# Patient Record
Sex: FEMALE | Race: BLACK OR AFRICAN AMERICAN | NOT HISPANIC OR LATINO | Employment: OTHER | RURAL
[De-identification: names, ages, dates, MRNs, and addresses within clinical notes are randomized per-mention and may not be internally consistent; named-entity substitution may affect disease eponyms.]

---

## 2023-07-26 DIAGNOSIS — M47.896 OTHER SPONDYLOSIS, LUMBAR REGION: Primary | ICD-10-CM

## 2023-07-26 DIAGNOSIS — Z98.1 ARTHRODESIS STATUS: ICD-10-CM

## 2023-08-02 ENCOUNTER — CLINICAL SUPPORT (OUTPATIENT)
Dept: REHABILITATION | Facility: HOSPITAL | Age: 68
End: 2023-08-02
Payer: COMMERCIAL

## 2023-08-02 DIAGNOSIS — Z98.1 ARTHRODESIS STATUS: ICD-10-CM

## 2023-08-02 DIAGNOSIS — M47.896 OTHER SPONDYLOSIS, LUMBAR REGION: ICD-10-CM

## 2023-08-02 PROCEDURE — 97161 PT EVAL LOW COMPLEX 20 MIN: CPT | Mod: PN

## 2023-08-02 PROCEDURE — 97110 THERAPEUTIC EXERCISES: CPT | Mod: PN

## 2023-08-02 NOTE — PLAN OF CARE
[facility]   Physical Therapy Initial Evaluation    Name: Elaine Sadler  Clinic Number: [id number]    Therapy Diagnosis:   Encounter Diagnoses   Name Primary?    Other spondylosis, lumbar region     Arthrodesis status      Physician: Hugh Rosenthal MD    Physician Orders: PT Eval and Treat    Medical Diagnosis from Referral: arthrodesis L5 s1 , lumbar spondylosis   Evaluation Date: 8/2/2023  Authorization Period Expiration: wellcare   Plan of Care Expiration: see wellcare   Visit # / Visits authorized: 1/ 11    Time In: 845  Time Out:  0945  Total Appointment Time (timed & untimed codes): 50 minutes    Precautions: Standard    Subjective   Date of onset: pt states she had lumbar sx with fusion L5s1 may 26th,   pt voices she was having weakness down her right leg. Pt voices she was having severe back pain which has improved.  Pt voices she is still sore on the left side of her scar, and she still has weakness in her right lower extremity.  Pt voices she has hx of severe knee pain bilaterally and is a candidate for knee replacements.      History of current condition - Elaine reports: hx above.      Medical History:   No past medical history on file.    Surgical History:   Elaine Sadler  has no past surgical history on file.    Medications:   Elaine currently has no medications in their medication list.    Allergies:   Review of patient's allergies indicates:  Not on File     Imaging, MRI studies:      Prior Therapy: none   Social History:   lives with their family  Occupation: retired cna   Prior Level of Function: independent , with chronic pain   Current Level of Function: weakness in lower extremity and low back pain     Pain:  Current 1/10, worst 7/10, best 1/10   Location: bilateral back , left side more sore but right side more weak  Description: Aching and Dull  Aggravating Factors: Standing she has to lean over.     Easing Factors: relaxation and lying down    Pts goals: be able to stand up  straight and not give out cooking and cleaning.      Objective     Posture:  Standing lordosis: Increased  Sitting lordosis: Increased  Iliac crest height: right increased  PSIS height: right increased  Pelvic rotation/torsion: Yes  Scoliosis: Yes  Lateral shift: right  Comments:      MANUAL MUSCLE TEST  Right left   Hip flexion MMT number: 3+/5 MMT strength: 3/5   Hip abduction MMT strength: 3+/5 MMT strength: 3+/5   Knee extension MMT strength: 3+/5 MMT strength: 3+/5   Knee flexion  MMT strength: 3+/5 MMT strength: 3+/5   Ankle dorsiflexion MMT strength: 3+/5 MMT strength: 3+/5   Ankle plantar flexion  MMT strength: 3+/5 MMT strength: 3+/5   Extensor hallucis longus MMT strength: 4/5 MMT strength: 4/5     ROM/flexibility right left   Hip flexion (120) 105 90   Internal rotation (45) 35 30   External rotation (45) 45 35   Hamstring 90/90 (-10) -20 -10                     Special test Right  Left    SLR test < 60 degrees Negative Negative   SLR test > 60 degrees Negative Negative   Sitting slump test Negative Negative   Piriformis test Negative Negative   ZECHARIAH test Negative Negative   SI forward bend Negative Positive   SI distraction Negative Positive   SI compression Negative Positive     Gait assessment:   Antalgic gait: Yes  Assistive device: quad cane    Spinal mobility:  Segment:     Other test/information:    Palpation: pt has left si joint pain with decreased internal rotation .     Dermatome:      Limitation/Restriction for FOTO lumbar  Survey    Therapist reviewed FOTO scores for Elaine Sadler on 8/2/2023.   FOTO documents entered into [employer] - see Media section.    Limitation Score: 39l%         TREATMENT       Elaine received the treatments listed below:  THERAPEUTIC EXERCISES to develop strength, endurance, ROM, flexibility, and posture for 45  minutes including home ex program     Home Exercises and Patient Education Provided    Education provided:   - range of motion and strength      Written Home  Exercises Provided: Patient instructed to cont prior HEP.  Exercises were reviewed and Elaine was able to demonstrate them prior to the end of the session.  Elaine demonstrated good  understanding of the education provided.     See EMR under Patient Instructions for exercises provided 8/2/2023.    Assessment   Elaine is a 67 y.o. female referred to outpatient Physical Therapy with a medical diagnosis of lumbar radicular pain .lumbar fusion L5s!  Pt presents with decreased range of motion and strength   .  Pt has hx of bilateral severe knee oa     Pt prognosis is Excellent.   Pt will benefit from skilled outpatient Physical Therapy to address the deficits stated above and in the chart below, provide pt/family education, and to maximize pt's level of independence.     Plan of care discussed with patient: Yes  Pt's spiritual, cultural and educational needs considered and patient is agreeable to the plan of care and goals as stated below:     Anticipated Barriers for therapy: medical diagnosis of lumbar radicular pain .lumbar fusion L5s!  Pt presents with decreased range of motion and strength   .  Pt has hx of bilateral severe knee oa        Short Term Goals: 4 weeks   Pt will be independent with home ex program   Pt will be able to increase hip flexion to 110 degress   Be able to increase lower strength to 4/5  Decrease resting pain to 0/10    Long Term Goals: 8  weeks   Pt will have 0/10 with household activity   Be able to stand and cook pain free .       Plan   Plan of care Certification: 8/2/2023 to 10/02/2023 .    Outpatient Physical Therapy 2 times weekly for 8 weeks to include the following interventions: Electrical Stimulation ifc , Manual Therapy, Patient Education, Therapeutic Exercise, and modalities as needed .     Andrew Allen, PT          I CERTIFY THE NEED FOR THESE SERVICES FURNISHED UNDER THIS PLAN OF TREATMENT AND WHILE UNDER MY CARE.    Physician's comments:      Physician's Signature:  ___________________________________________________

## 2023-08-04 ENCOUNTER — CLINICAL SUPPORT (OUTPATIENT)
Dept: REHABILITATION | Facility: HOSPITAL | Age: 68
End: 2023-08-04
Payer: COMMERCIAL

## 2023-08-04 DIAGNOSIS — M47.896 OTHER SPONDYLOSIS, LUMBAR REGION: Primary | ICD-10-CM

## 2023-08-04 PROCEDURE — 97112 NEUROMUSCULAR REEDUCATION: CPT | Mod: PN,CQ

## 2023-08-04 PROCEDURE — 97110 THERAPEUTIC EXERCISES: CPT | Mod: PN,CQ

## 2023-08-04 PROCEDURE — 97014 ELECTRIC STIMULATION THERAPY: CPT | Mod: PN,CQ

## 2023-08-04 NOTE — PROGRESS NOTES
Physical Therapy Treatment Note     Name: Elaine Sadler  Clinic Number: [id number]    Therapy Diagnosis: No diagnosis found.  Physician: Hugh Rosenthal MD    Visit Date: 8/4/2023    Physician Orders: PT Eval and Treat    Medical Diagnosis from Referral: arthrodesis L5 s1 , lumbar spondylosis   Evaluation Date: 8/2/2023  Authorization Period Expiration: wellcare   Plan of Care Expiration: see wellcare   Visit # / Visits authorized: 1/ 11  PTA Visit #: 1/5    Time In: 812  Time Out: 902  Total Billable Time: 50 minutes    Precautions: Standard  Functional Level Prior to Evaluation: independent , with chronic pain     Subjective     Pt reports: she did part of her HEP. States she didn't have any trouble with   She was compliant with home exercise program.  Response to previous treatment: Sore but overall better with stretching   Functional change: decreased pain; improved ROM    Pain: 1/10  Location: bilateral back      Objective     Elaine received therapeutic exercises to develop strength, ROM, flexibility, posture, and core stabilization for 18 minutes including:  Hip ABD/ ADD 10 x 10 sec each with belt and ball  Sidelying ER with red t band x 10 each side with ball  Thera-ball HS curls and lower trunk rotations x 10 reps each  Hamstring Stretches 3 x 30 seconds each bilateral    Elaine received the following manual therapy techniques: (not today)    Elaine participated in neuromuscular re-education activities to improve: Proprioception and Posture for 8 minutes. The following activities were included:  Hooklying: March 2 x 10, Kicks 2 x 10, Post Pelvic tilts 2 x 10      Elaine received the following supervised modalities after being cleared for contradictions: IFC Electrical Stimulation:  Elaine received IFC Electrical Stimulation for pain control applied to the low back. Pt received stimulation at 100 % scan for 15 minutes. Elaine tolderated treatment well without any adverse effects.      Elaine received hot  pack for 15 minutes to low back.        Home Exercises Provided and Patient Education Provided     Education provided: postural cues; cont. HEP    Written Home Exercises Provided: Patient instructed to cont prior HEP.  Exercises were reviewed and Elaine was able to demonstrate them prior to the end of the session.  Elaine demonstrated good  understanding of the education provided.     See EMR under Media for exercises provided prior visit.    Assessment     Pt. With decreased pain to 0/10 and improved ROM AEB SLR to 90 with last HS stretches bilateral today.  Elaine Is progressing well towards her goals.   Pt prognosis is Excellent.     Pt will continue to benefit from skilled outpatient physical therapy to address the deficits listed in the problem list box on initial evaluation, provide pt/family education and to maximize pt's level of independence in the home and community environment.     Pt's spiritual, cultural and educational needs considered and pt agreeable to plan of care and goals.     Anticipated barriers to physical therapy: medical diagnosis of lumbar radicular pain .lumbar fusion L5s!  Pt presents with decreased range of motion and strength   .  Pt has hx of bilateral severe knee oa     Goals:    Short Term Goals: 4 weeks   Pt will be independent with home ex program   Pt will be able to increase hip flexion to 110 degress   Be able to increase lower strength to 4/5  Decrease resting pain to 0/10     Long Term Goals: 8  weeks   Pt will have 0/10 with household activity   Be able to stand and cook pain free     Plan     Plan of care Certification: 8/2/2023 to 10/02/2023 .     Outpatient Physical Therapy 2 times weekly for 8 weeks to include the following interventions: Electrical Stimulation ifc , Manual Therapy, Patient Education, Therapeutic Exercise, and modalities as needed .    Brittany Krause, PTA  8/4/2023

## 2023-08-08 ENCOUNTER — CLINICAL SUPPORT (OUTPATIENT)
Dept: REHABILITATION | Facility: HOSPITAL | Age: 68
End: 2023-08-08
Payer: COMMERCIAL

## 2023-08-08 DIAGNOSIS — M25.69 DECREASED RANGE OF MOTION OF TRUNK AND BACK: ICD-10-CM

## 2023-08-08 DIAGNOSIS — M47.896 OTHER SPONDYLOSIS, LUMBAR REGION: Primary | ICD-10-CM

## 2023-08-08 PROCEDURE — 97110 THERAPEUTIC EXERCISES: CPT | Mod: PN,CQ

## 2023-08-08 PROCEDURE — 97014 ELECTRIC STIMULATION THERAPY: CPT | Mod: PN,CQ

## 2023-08-08 PROCEDURE — 97112 NEUROMUSCULAR REEDUCATION: CPT | Mod: PN,CQ

## 2023-08-08 NOTE — PROGRESS NOTES
Physical Therapy Treatment Note     Name: Elaine Sadler  Clinic Number: [id number]    Therapy Diagnosis: No diagnosis found.  Physician: Hugh Rosenthal MD    Visit Date: 8/8/2023    Physician Orders: PT Eval and Treat    Medical Diagnosis from Referral: arthrodesis L5 s1 , lumbar spondylosis   Evaluation Date: 8/2/2023  Authorization Period Expiration: wellcare   Plan of Care Expiration: see wellcare   Visit # / Visits authorized: 3/ 11  PTA Visit #: 2/5      Time In: 842  Time Out: 930  Total Billable Time: 48 minutes    Precautions: Standard  Plan of care reviewed with Andrew Allen PT.      Subjective     Pt reports: I dont have sharp pain anymore just nagging pain.  She was compliant with home exercise program.  Response to previous treatment: sore  Functional change: better sleep     Pain: 3/10  Location: bilateral back      Objective     Elaine received therapeutic exercises to develop strength, endurance, ROM, flexibility, posture, and core stabilization for 11 minutes including:  Bike x 4'  Swissball knee flexion x 10   Swissball trunk rotation x 10  Bilateral single knee to chest/ piriformis x 5    Range of motion   Hip flexion   right 106       left   92    Elaine received the following manual therapy techniques: Manual traction and Soft tissue Mobilization were applied to the: left lower extremity  for 5 minutes, including:  Long leg distraction  Side lying chicago mob (performed by physical therapist)    Elaine participated in neuromuscular re-education activities to improve: Posture for 12 minutes. The following activities were included:  Slant board x 2'  Bilateral hamstrings stretch on step x 5  Bilateral side bend stretch x 5  Hip adduction with bolster and pelvic tilt x 10  Bilateral side lying hip external rotation with ball and red theraband x 10      Elaine received the following supervised modalities after being cleared for contradictions: IFC Electrical Stimulation:  Elaine received IFC  Electrical Stimulation for pain control applied to the low back. Pt received stimulation at 100 % scan at a frequency of 18 for 20 minutes. Elaine tolderated treatment well without any adverse effects.      Elaine received hot pack for 20 minutes to low back.      Home Exercises Provided and Patient Education Provided     Education provided: home exercise program     Written Home Exercises Provided: Patient instructed to cont prior HEP.  Exercises were reviewed and Elaine was able to demonstrate them prior to the end of the session.  Elaine demonstrated good  understanding of the education provided.     See EMR under Patient Instructions for exercises provided prior visit.    Assessment     Patient voicing decreased  pain 1 /10 after treatment, improved SI alignment.  Elaine Is progressing well towards her goals.   Pt prognosis is Excellent.     Pt will continue to benefit from skilled outpatient physical therapy to address the deficits listed in the problem list box on initial evaluation, provide pt/family education and to maximize pt's level of independence in the home and community environment.     Pt's spiritual, cultural and educational needs considered and pt agreeable to plan of care and goals.     Anticipated barriers to physical therapy: compliance with home exercise program     Goals:  Short Term Goals: 4 weeks   Pt will be independent with home ex program   Pt will be able to increase hip flexion to 110 degress   Be able to increase lower strength to 4/5  Decrease resting pain to 0/10     Long Term Goals: 8  weeks   Pt will have 0/10 with household activity   Be able to stand and cook pain free     Plan     Plan of care Certification: 8/2/2023 to 10/02/2023 .     Outpatient Physical Therapy 2 times weekly for 8 weeks to include the following interventions: Electrical Stimulation ifc , Manual Therapy, Patient Education, Therapeutic Exercise, and modalities as needed .  aKe Pathak, PTA  8/8/2023

## 2023-08-11 ENCOUNTER — CLINICAL SUPPORT (OUTPATIENT)
Dept: REHABILITATION | Facility: HOSPITAL | Age: 68
End: 2023-08-11
Payer: COMMERCIAL

## 2023-08-11 DIAGNOSIS — M25.69 DECREASED RANGE OF MOTION OF TRUNK AND BACK: Primary | ICD-10-CM

## 2023-08-11 PROCEDURE — 97112 NEUROMUSCULAR REEDUCATION: CPT | Mod: PN,CQ

## 2023-08-11 PROCEDURE — 97014 ELECTRIC STIMULATION THERAPY: CPT | Mod: PN,CQ

## 2023-08-11 PROCEDURE — 97110 THERAPEUTIC EXERCISES: CPT | Mod: PN,CQ

## 2023-08-11 NOTE — PROGRESS NOTES
Physical Therapy Treatment Note     Name: Elaine Sadler  Clinic Number: [id number]    Therapy Diagnosis:   Encounter Diagnosis   Name Primary?    Decreased range of motion of trunk and back Yes     Physician: Hugh Rosenthal MD    Visit Date: 8/11/2023    Physician Orders: PT Eval and Treat    Medical Diagnosis from Referral: arthrodesis L5 s1 , lumbar spondylosis   Evaluation Date: 8/2/2023  Authorization Period Expiration: wellcare   Plan of Care Expiration: see wellcare   Visit # / Visits authorized: 4/ 11  PTA Visit #: 3/5      Time In: 930  Time Out: 1020  Total Billable Time: 50 minutes    Precautions: Standard  Plan of care reviewed with Andrew Allen PT.      Subjective     Pt reports: my back is good, but my knee hurts.  She was compliant with home exercise program.  Response to previous treatment: sore  Functional change: better sleep     Pain: 1/10  Location: bilateral back      Objective     Elaine received therapeutic exercises to develop strength, endurance, ROM, flexibility, posture, and core stabilization for 15 minutes including:  Bike x 5'  Swissball knee flexion x 10   Swissball trunk rotation x 10  Bilateral single knee to chest/ piriformis x 5  Bridging x 10    Range of motion   Hip flexion   right 110       left   95    Elaine received the following manual therapy techniques: Manual traction and Soft tissue Mobilization were applied to the: left lower extremity  for  minutes, including:    Elaine participated in neuromuscular re-education activities to improve: Posture for 15 minutes. The following activities were included:  Slant board x 2'  Bilateral hamstrings stretch on step x 5  Bilateral side bend stretch x 5  Hip adduction with bolster and pelvic tilt x 10  Bilateral side lying hip external rotation with ball and red theraband x 10      Elaine received the following supervised modalities after being cleared for contradictions: IFC Electrical Stimulation:  Elaine received IFC Electrical  Stimulation for pain control applied to the low back. Pt received stimulation at 100 % scan at a frequency of 18 for 20 minutes. Elaine tolderated treatment well without any adverse effects.      Elaine received hot pack for 20 minutes to low back.    Home Exercises Provided and Patient Education Provided     Education provided: home exercise program     Written Home Exercises Provided: Patient instructed to cont prior HEP.  Exercises were reviewed and Elaine was able to demonstrate them prior to the end of the session.  Elaine demonstrated good  understanding of the education provided.     See EMR under Patient Instructions for exercises provided prior visit.    Assessment     Patient progressing with lower extremity flexibility range of motion   Elaine Is progressing well towards her goals.   Pt prognosis is Excellent.     Pt will continue to benefit from skilled outpatient physical therapy to address the deficits listed in the problem list box on initial evaluation, provide pt/family education and to maximize pt's level of independence in the home and community environment.     Pt's spiritual, cultural and educational needs considered and pt agreeable to plan of care and goals.     Anticipated barriers to physical therapy: compliance with home exercise program     Goals:  Short Term Goals: 4 weeks   Pt will be independent with home ex program   Pt will be able to increase hip flexion to 110 degress   Be able to increase lower strength to 4/5  Decrease resting pain to 0/10     Long Term Goals: 8  weeks   Pt will have 0/10 with household activity   Be able to stand and cook pain free     Plan     Plan of care Certification: 8/2/2023 to 10/02/2023 .     Outpatient Physical Therapy 2 times weekly for 8 weeks to include the following interventions: Electrical Stimulation ifc , Manual Therapy, Patient Education, Therapeutic Exercise, and modalities as needed .  Kae Pathak, PTA  8/11/2023

## 2023-08-14 ENCOUNTER — CLINICAL SUPPORT (OUTPATIENT)
Dept: REHABILITATION | Facility: HOSPITAL | Age: 68
End: 2023-08-14
Payer: COMMERCIAL

## 2023-08-14 DIAGNOSIS — M25.69 DECREASED RANGE OF MOTION OF TRUNK AND BACK: Primary | ICD-10-CM

## 2023-08-14 PROCEDURE — 97110 THERAPEUTIC EXERCISES: CPT | Mod: PN

## 2023-08-14 PROCEDURE — 97112 NEUROMUSCULAR REEDUCATION: CPT | Mod: PN

## 2023-08-14 PROCEDURE — 97014 ELECTRIC STIMULATION THERAPY: CPT | Mod: PN

## 2023-08-14 NOTE — PROGRESS NOTES
Physical Therapy Treatment Note     Name: Elaine Sadler  Clinic Number: [id number]    Therapy Diagnosis:   Encounter Diagnosis   Name Primary?    Decreased range of motion of trunk and back Yes     Physician: Hugh Rosenthal MD    Visit Date: 8/14/2023    Physician Orders: PT Eval and Treat    Medical Diagnosis from Referral: arthrodesis L5 s1 , lumbar spondylosis   Evaluation Date: 8/2/2023  Authorization Period Expiration: wellcare   Plan of Care Expiration: see wellcare   Visit # / Visits authorized: 5/ 11  PTA Visit #:       Time In: 930  Time Out: 1020  Total Billable Time: 50 minutes    Precautions: Standard  Plan of care reviewed with Andrew Allen PT.      Subjective     Pt reports: no pain just sore beneath scar for back , cont chronic knee pain   She was compliant with home exercise program.  Response to previous treatment: sore  Functional change: better sleep     Pain: 0/10  Location: bilateral back      Objective     Elaine received therapeutic exercises to develop strength, endurance, ROM, flexibility, posture, and core stabilization for 15 minutes including:  Bike x 5'  Swissball knee flexion x 10   Swissball trunk rotation x 10  Bilateral single knee to chest/ piriformis x 5  Bridging x 10    Range of motion   Hip flexion   right 110       left   100    Elaine received the following manual therapy techniques: Manual traction and Soft tissue Mobilization were applied to the: left lower extremity  for  minutes, including:    Elaine participated in neuromuscular re-education activities to improve: Posture for 15 minutes. The following activities were included:  Slant board x 2'  Bilateral hamstrings stretch on step x 5  Bilateral side bend stretch x 5  Hip adduction with bolster and pelvic tilt x 10  Bilateral side lying hip external rotation with ball and red theraband x 10      Elaine received the following supervised modalities after being cleared for contradictions: IFC Electrical Stimulation:   Elaine received IFC Electrical Stimulation for pain control applied to the low back. Pt received stimulation at 100 % scan at a frequency of 18 for 20 minutes. Elaine tolderated treatment well without any adverse effects.      Elaine received hot pack for 20 minutes to low back.    Home Exercises Provided and Patient Education Provided     Education provided: home exercise program     Written Home Exercises Provided: Patient instructed to cont prior HEP.  Exercises were reviewed and Elaine was able to demonstrate them prior to the end of the session.  Elaine demonstrated good  understanding of the education provided.     See EMR under Patient Instructions for exercises provided prior visit.    Assessment     Patient progressing with lower extremity flexibility range of motion   Elaine Is progressing well towards her goals.   Pt prognosis is Excellent.     Pt will continue to benefit from skilled outpatient physical therapy to address the deficits listed in the problem list box on initial evaluation, provide pt/family education and to maximize pt's level of independence in the home and community environment.     Pt's spiritual, cultural and educational needs considered and pt agreeable to plan of care and goals.     Anticipated barriers to physical therapy: compliance with home exercise program     Goals:  Short Term Goals: 4 weeks   Pt will be independent with home ex program   Pt will be able to increase hip flexion to 110 degress   Be able to increase lower strength to 4/5  Decrease resting pain to 0/10     Long Term Goals: 8  weeks   Pt will have 0/10 with household activity   Be able to stand and cook pain free     Plan     Plan of care Certification: 8/2/2023 to 10/02/2023 .     Outpatient Physical Therapy 2 times weekly for 8 weeks to include the following interventions: Electrical Stimulation ifc , Manual Therapy, Patient Education, Therapeutic Exercise, and modalities as needed .    Plan of care has been  reestablished with Trice Pathak LPTA, Peggy Villegas LPTA, Brittany Krause LPTA  and Sujatha Santana LPTA.     Andrew Allen, PT  8/14/2023

## 2023-08-17 ENCOUNTER — CLINICAL SUPPORT (OUTPATIENT)
Dept: REHABILITATION | Facility: HOSPITAL | Age: 68
End: 2023-08-17
Payer: COMMERCIAL

## 2023-08-17 DIAGNOSIS — M25.69 DECREASED RANGE OF MOTION OF TRUNK AND BACK: Primary | ICD-10-CM

## 2023-08-17 PROCEDURE — 97112 NEUROMUSCULAR REEDUCATION: CPT | Mod: PN,CQ

## 2023-08-17 PROCEDURE — 97014 ELECTRIC STIMULATION THERAPY: CPT | Mod: PN,CQ

## 2023-08-17 PROCEDURE — 97110 THERAPEUTIC EXERCISES: CPT | Mod: PN,CQ

## 2023-08-17 NOTE — PROGRESS NOTES
Physical Therapy Treatment Note     Name: Elaine Sadler  Clinic Number: [id number]    Therapy Diagnosis:   Encounter Diagnosis   Name Primary?    Decreased range of motion of trunk and back Yes     Physician: Hugh Rosenthal MD    Visit Date: 8/17/2023    Physician Orders: PT Eval and Treat    Medical Diagnosis from Referral: arthrodesis L5 s1 , lumbar spondylosis   Evaluation Date: 8/2/2023  Authorization Period Expiration: 10/2/23  Plan of Care Expiration: see wellcare   Visit # / Visits authorized: 6/ 11  PTA Visit #: 1      Time In: 840  Time Out: 930  Total Billable Time: 50 minutes    Precautions: Standard  Plan of care reviewed with Andrew Allen PT.      Subjective     Pt reports: my back is doing good, my knee is really popping and causing me issues.  She was compliant with home exercise program.  Response to previous treatment: sore  Functional change: better sleep     Pain: 1/10  Location: bilateral back      Objective     Elaine received therapeutic exercises to develop strength, endurance, ROM, flexibility, posture, and core stabilization for 15 minutes including:  Bike x 5'  Swissball knee flexion x 10   Swissball trunk rotation x 10  Bilateral single knee to chest/ piriformis x 5  Bridging x 10  Partial sit ups x 10    Range of motion   Hip flexion   right 110       left   102    Elaine received the following manual therapy techniques: Manual traction and Soft tissue Mobilization were applied to the: left lower extremity  for  minutes, including:    Elaine participated in neuromuscular re-education activities to improve: Posture for 15 minutes. The following activities were included:  Slant board x 2'  Bilateral hamstrings stretch on step x 5  Bilateral side bend stretch x 5  Hip adduction with bolster and pelvic tilt x 10  Bilateral side lying hip external rotation with ball and red theraband x 10      Elaine received the following supervised modalities after being cleared for contradictions: IFC  Electrical Stimulation:  Elaine received IFC Electrical Stimulation for pain control applied to the low back. Pt received stimulation at 100 % scan at a frequency of 21 for 20 minutes. Elaine tolderated treatment well without any adverse effects.      Elaine received hot pack for 20 minutes to low back.    Home Exercises Provided and Patient Education Provided     Education provided: home exercise program     Written Home Exercises Provided: Patient instructed to cont prior HEP.  Exercises were reviewed and Elaine was able to demonstrate them prior to the end of the session.  Elaine demonstrated good  understanding of the education provided.     See EMR under Patient Instructions for exercises provided prior visit.    Assessment     Patient instructed to include partial sit ups to home exercise program, weak core stability.  Elaine Is progressing well towards her goals.   Pt prognosis is Excellent.     Pt will continue to benefit from skilled outpatient physical therapy to address the deficits listed in the problem list box on initial evaluation, provide pt/family education and to maximize pt's level of independence in the home and community environment.     Pt's spiritual, cultural and educational needs considered and pt agreeable to plan of care and goals.     Anticipated barriers to physical therapy: compliance with home exercise program     Goals:  Short Term Goals: 4 weeks   Pt will be independent with home ex program -met  Pt will be able to increase hip flexion to 110 degress   Be able to increase lower strength to 4/5  Decrease resting pain to 0/10-met     Long Term Goals: 8  weeks   Pt will have 0/10 with household activity   Be able to stand and cook pain free     Plan     Plan of care Certification: 8/2/2023 to 10/02/2023 .     Outpatient Physical Therapy 2 times weekly for 8 weeks to include the following interventions: Electrical Stimulation ifc , Manual Therapy, Patient Education, Therapeutic Exercise,  and modalities as needed .    Plan of care has been reestablished with Trice Pathak LPTA, Peggy Villegas LPTA, Brittany Krause LPTA  and Sujatha Santana LPTA.     Kae Pathak, PTA  8/17/2023

## 2023-08-22 ENCOUNTER — CLINICAL SUPPORT (OUTPATIENT)
Dept: REHABILITATION | Facility: HOSPITAL | Age: 68
End: 2023-08-22
Payer: COMMERCIAL

## 2023-08-22 DIAGNOSIS — M25.69 DECREASED RANGE OF MOTION OF TRUNK AND BACK: Primary | ICD-10-CM

## 2023-08-22 PROCEDURE — 97110 THERAPEUTIC EXERCISES: CPT | Mod: PN,CQ

## 2023-08-22 PROCEDURE — 97112 NEUROMUSCULAR REEDUCATION: CPT | Mod: PN,CQ

## 2023-08-22 PROCEDURE — 97014 ELECTRIC STIMULATION THERAPY: CPT | Mod: PN,CQ

## 2023-08-22 NOTE — PROGRESS NOTES
Physical Therapy Treatment Note     Name: Elaine Sadler  Clinic Number: [id number]    Therapy Diagnosis:   Encounter Diagnosis   Name Primary?    Decreased range of motion of trunk and back Yes     Physician: Hugh Rosenthal MD    Visit Date: 8/22/2023    Physician Orders: PT Eval and Treat    Medical Diagnosis from Referral: arthrodesis L5 s1 , lumbar spondylosis   Evaluation Date: 8/2/2023  Authorization Period Expiration: 10/2/23  Plan of Care Expiration: see wellcare   Visit # / Visits authorized: 7/ 11  PTA Visit #: 2    Time In: 835  Time Out: 925  Total Billable Time: 50 minutes    Precautions: Standard  Plan of care reviewed with Andrew Allen PT.      Subjective     Pt reports: I lost my balance the other day due to my knee giving away and bruised my shoulder. My low back aches.  She was compliant with home exercise program.  Response to previous treatment: sore  Functional change: better sleep     Pain: 3/10  Location: bilateral back      Objective     Elaine received therapeutic exercises to develop strength, endurance, ROM, flexibility, posture, and core stabilization for 15 minutes including:  Bike x 5'  Swissball knee flexion x 10   Swissball trunk rotation x 10  Bilateral single knee to chest/ piriformis x 5  Bridging x 10  Supine frog stretch x 5     Range of motion   Hip flexion   right 110       left   102    Elaine received the following manual therapy techniques: Manual traction and Soft tissue Mobilization were applied to the: left lower extremity  for  minutes, including:    Elaine participated in neuromuscular re-education activities to improve: Posture for 15 minutes. The following activities were included:  Slant board x 2'  Bilateral hamstrings stretch on step x 5  Bilateral side bend stretch x 5  Hip adduction with bolster and pelvic tilt x 10  Bilateral supine hip adduction stretch x 5    Elaine received the following supervised modalities after being cleared for contradictions: IFC  Electrical Stimulation:  Elaine received IFC Electrical Stimulation for pain control applied to the low back. Pt received stimulation at 100 % scan at a frequency of 19 for 20 minutes. Elaine tolderated treatment well without any adverse effects.      Elaine received hot pack for 20 minutes to low back.    Home Exercises Provided and Patient Education Provided     Education provided: home exercise program     Written Home Exercises Provided: Patient instructed to cont prior HEP.  Exercises were reviewed and Elaine was able to demonstrate them prior to the end of the session.  Elaine demonstrated good  understanding of the education provided.     See EMR under Patient Instructions for exercises provided prior visit.    Assessment     Patient voicing decreased pain 1/10 after treatment   Elaine Is progressing well towards her goals.   Pt prognosis is Excellent.     Pt will continue to benefit from skilled outpatient physical therapy to address the deficits listed in the problem list box on initial evaluation, provide pt/family education and to maximize pt's level of independence in the home and community environment.     Pt's spiritual, cultural and educational needs considered and pt agreeable to plan of care and goals.     Anticipated barriers to physical therapy: compliance with home exercise program     Goals:  Short Term Goals: 4 weeks   Pt will be independent with home ex program -met  Pt will be able to increase hip flexion to 110 degress   Be able to increase lower strength to 4/5  Decrease resting pain to 0/10-met     Long Term Goals: 8  weeks   Pt will have 0/10 with household activity   Be able to stand and cook pain free     Plan     Plan of care Certification: 8/2/2023 to 10/02/2023 .     Outpatient Physical Therapy 2 times weekly for 8 weeks to include the following interventions: Electrical Stimulation ifc , Manual Therapy, Patient Education, Therapeutic Exercise, and modalities as needed .    Plan of  care has been reestablished with Trice Pathak LPTA, Peggy Villegas LPTA, Brittany Krause LPTA  and Sujatha Santana LPTA.     Kae Pathak, PTA  8/22/2023

## 2023-08-24 ENCOUNTER — CLINICAL SUPPORT (OUTPATIENT)
Dept: REHABILITATION | Facility: HOSPITAL | Age: 68
End: 2023-08-24
Payer: COMMERCIAL

## 2023-08-24 DIAGNOSIS — M25.561 BILATERAL CHRONIC KNEE PAIN: ICD-10-CM

## 2023-08-24 DIAGNOSIS — M25.69 DECREASED RANGE OF MOTION OF TRUNK AND BACK: Primary | ICD-10-CM

## 2023-08-24 DIAGNOSIS — G89.29 BILATERAL CHRONIC KNEE PAIN: ICD-10-CM

## 2023-08-24 DIAGNOSIS — M25.562 BILATERAL CHRONIC KNEE PAIN: ICD-10-CM

## 2023-08-24 PROCEDURE — 97014 ELECTRIC STIMULATION THERAPY: CPT | Mod: PN,CQ

## 2023-08-24 PROCEDURE — 97110 THERAPEUTIC EXERCISES: CPT | Mod: PN,CQ

## 2023-08-24 PROCEDURE — 97112 NEUROMUSCULAR REEDUCATION: CPT | Mod: PN,CQ

## 2023-08-24 NOTE — PROGRESS NOTES
Physical Therapy Treatment Note     Name: Elaine Sadler  Clinic Number: [id number]    Therapy Diagnosis:   No diagnosis found.    Physician: Hugh Rosenthal MD    Visit Date: 8/24/2023    Physician Orders: PT Eval and Treat    Medical Diagnosis from Referral: arthrodesis L5 s1 , lumbar spondylosis   Evaluation Date: 8/2/2023  Authorization Period Expiration: 10/2/23  Plan of Care Expiration: see wellcare   Visit # / Visits authorized: 8/ 11  PTA Visit #: 3    Time In: 842  Time Out: 932  Total Billable Time: 50 minutes    Precautions: Standard  Plan of care reviewed with Andrew Allen PT.      Subjective     Pt reports: my back is just stiff, but not hurting bad. The soreness along the left side is moving out.  She was compliant with home exercise program.  Response to previous treatment: sore  Functional change: better sleep     Pain: 2/10  Location: bilateral back      Objective     Elaine received therapeutic exercises to develop strength, endurance, ROM, flexibility, posture, and core stabilization for 15 minutes including:  Bike x 6'  Swissball knee flexion x 10   Swissball trunk rotation x 10  Bilateral single knee to chest/ piriformis x 5  Bridging x 10  Supine frog stretch x 5     Range of motion   Hip flexion   right 111      left   105    Elaine received the following manual therapy techniques: Manual traction and Soft tissue Mobilization were applied to the: left lower extremity  for  minutes, including:    Elaine participated in neuromuscular re-education activities to improve: Posture for 15 minutes. The following activities were included:  Slant board x 2'  Bilateral hamstrings stretch on step x 5  Bilateral side bend stretch x 5  Hip adduction with bolster and pelvic tilt x 10  Bilateral supine hip adduction stretch x 5  Scapular retraction blue with thumbs out x 10    Elaine received the following supervised modalities after being cleared for contradictions: IFC Electrical Stimulation:  Elaine  received IFC Electrical Stimulation for pain control applied to the low back. Pt received stimulation at 100 % scan at a frequency of 16 for 20 minutes. Elaine tolderated treatment well without any adverse effects.      Elaine received hot pack for 20 minutes to low back.    Home Exercises Provided and Patient Education Provided     Education provided: home exercise program     Written Home Exercises Provided: Patient instructed to cont prior HEP.  Exercises were reviewed and Elaine was able to demonstrate them prior to the end of the session.  Elaine demonstrated good  understanding of the education provided.     See EMR under Patient Instructions for exercises provided prior visit.    Assessment     Patient progressing gradually with lower extremity range of motion   Elaine Is progressing well towards her goals.   Pt prognosis is Excellent.     Pt will continue to benefit from skilled outpatient physical therapy to address the deficits listed in the problem list box on initial evaluation, provide pt/family education and to maximize pt's level of independence in the home and community environment.     Pt's spiritual, cultural and educational needs considered and pt agreeable to plan of care and goals.     Anticipated barriers to physical therapy: compliance with home exercise program     Goals:  Short Term Goals: 4 weeks   Pt will be independent with home ex program -met  Pt will be able to increase hip flexion to 110 degress   Be able to increase lower strength to 4/5  Decrease resting pain to 0/10-met     Long Term Goals: 8  weeks   Pt will have 0/10 with household activity   Be able to stand and cook pain free     Plan     Plan of care Certification: 8/2/2023 to 10/02/2023 .     Outpatient Physical Therapy 2 times weekly for 8 weeks to include the following interventions: Electrical Stimulation ifc , Manual Therapy, Patient Education, Therapeutic Exercise, and modalities as needed .    Plan of care has been  reestablished with Trice Pathak LPTA, Peggy Villegas LPTA, Brittany Krause LPTA  and Sujatha Santana LPTA.     Kae Pathak, PTA  8/24/2023

## 2023-08-29 ENCOUNTER — CLINICAL SUPPORT (OUTPATIENT)
Dept: REHABILITATION | Facility: HOSPITAL | Age: 68
End: 2023-08-29
Payer: COMMERCIAL

## 2023-08-29 DIAGNOSIS — M25.561 BILATERAL CHRONIC KNEE PAIN: ICD-10-CM

## 2023-08-29 DIAGNOSIS — M25.562 BILATERAL CHRONIC KNEE PAIN: ICD-10-CM

## 2023-08-29 DIAGNOSIS — M25.69 DECREASED RANGE OF MOTION OF TRUNK AND BACK: Primary | ICD-10-CM

## 2023-08-29 DIAGNOSIS — G89.29 BILATERAL CHRONIC KNEE PAIN: ICD-10-CM

## 2023-08-29 PROCEDURE — 97112 NEUROMUSCULAR REEDUCATION: CPT | Mod: PN,CQ

## 2023-08-29 PROCEDURE — 97035 APP MDLTY 1+ULTRASOUND EA 15: CPT | Mod: PN,CQ

## 2023-08-29 PROCEDURE — 97110 THERAPEUTIC EXERCISES: CPT | Mod: PN,CQ

## 2023-08-29 NOTE — PROGRESS NOTES
Physical Therapy Treatment Note     Name: Elaine Sadler  Clinic Number: [id number]    Therapy Diagnosis:   Encounter Diagnoses   Name Primary?    Decreased range of motion of trunk and back Yes    Bilateral chronic knee pain        Physician: Hugh Rosenthal MD    Visit Date: 8/29/2023    Physician Orders: PT Eval and Treat    Medical Diagnosis from Referral: arthrodesis L5 s1 , lumbar spondylosis   Evaluation Date: 8/2/2023  Authorization Period Expiration: 10/2/23  Plan of Care Expiration: see wellcare   Visit # / Visits authorized: 9/ 11  PTA Visit #: 4    Time In: 925  Time Out: 1010  Total Billable Time: 45 minutes    Precautions: Standard  Plan of care reviewed with Andrew Allen PT.      Subjective     Pt reports: I still have one area that Is sore along the left low back  She was compliant with home exercise program.  Response to previous treatment: sore  Functional change: better sleep     Pain: 1/10  Location: bilateral back      Objective     Elaine received therapeutic exercises to develop strength, endurance, ROM, flexibility, posture, and core stabilization for 20 minutes including:  Bike x 6'  Swissball knee flexion x 10   Swissball trunk rotation x 10  Bilateral single knee to chest/ piriformis x 5  Bridging x 10  Supine frog stretch x 5   Partial sit ups x 10    Range of motion   Hip flexion   right 111      left   105    Elaine received the following manual therapy techniques: Manual traction and Soft tissue Mobilization were applied to the: left lower extremity  for  minutes, including:    Elaine participated in neuromuscular re-education activities to improve: Posture for 17 minutes. The following activities were included:  Slant board x 2'  Bilateral hamstrings stretch on step x 5  Bilateral side bend stretch x 5  Hip adduction with bolster and pelvic tilt x 10  Bilateral supine hip adduction stretch x 5     Patient received US to left low back at 2.0 w/cm2/1 mhz x 8' without adverse  affects.    Elaine received the following supervised modalities after being cleared for contradictions: IFC Electrical Stimulation:  Elaine received IFC Electrical Stimulation for pain control applied to the low back. Pt received stimulation at  % scan at a frequency of  for  minutes. Elaine tolderated treatment well without any adverse effects.      Elaine received hot pack for  minutes to low back.    Home Exercises Provided and Patient Education Provided     Education provided: home exercise program     Written Home Exercises Provided: Patient instructed to cont prior HEP.  Exercises were reviewed and Elaine was able to demonstrate them prior to the end of the session.  Elaine demonstrated good  understanding of the education provided.     See EMR under Patient Instructions for exercises provided prior visit.    Assessment     Patient voicing decreased muscle tightness after US  Elaine Is progressing well towards her goals.   Pt prognosis is Excellent.     Pt will continue to benefit from skilled outpatient physical therapy to address the deficits listed in the problem list box on initial evaluation, provide pt/family education and to maximize pt's level of independence in the home and community environment.     Pt's spiritual, cultural and educational needs considered and pt agreeable to plan of care and goals.     Anticipated barriers to physical therapy: compliance with home exercise program     Goals:  Short Term Goals: 4 weeks   Pt will be independent with home ex program -met  Pt will be able to increase hip flexion to 110 degress   Be able to increase lower strength to 4/5  Decrease resting pain to 0/10-met     Long Term Goals: 8  weeks   Pt will have 0/10 with household activity   Be able to stand and cook pain free     Plan     Plan of care Certification: 8/2/2023 to 10/02/2023 .     Outpatient Physical Therapy 2 times weekly for 8 weeks to include the following interventions: Electrical Stimulation ifc  , Manual Therapy, Patient Education, Therapeutic Exercise, and modalities as needed .    Plan of care has been reestablished with Trice DENNISON, Peggy Villegas LPTA, Brittany Krause LPTA  and Sujatha Santana LPTA.     Kae Pathak, PTA  8/29/2023

## 2023-08-31 ENCOUNTER — CLINICAL SUPPORT (OUTPATIENT)
Dept: REHABILITATION | Facility: HOSPITAL | Age: 68
End: 2023-08-31
Payer: COMMERCIAL

## 2023-08-31 DIAGNOSIS — M25.562 BILATERAL CHRONIC KNEE PAIN: ICD-10-CM

## 2023-08-31 DIAGNOSIS — G89.29 BILATERAL CHRONIC KNEE PAIN: ICD-10-CM

## 2023-08-31 DIAGNOSIS — M25.561 BILATERAL CHRONIC KNEE PAIN: ICD-10-CM

## 2023-08-31 DIAGNOSIS — M47.896 OTHER SPONDYLOSIS, LUMBAR REGION: ICD-10-CM

## 2023-08-31 DIAGNOSIS — M25.69 DECREASED RANGE OF MOTION OF TRUNK AND BACK: Primary | ICD-10-CM

## 2023-08-31 PROCEDURE — 97014 ELECTRIC STIMULATION THERAPY: CPT | Mod: PN

## 2023-08-31 PROCEDURE — 97112 NEUROMUSCULAR REEDUCATION: CPT | Mod: PN

## 2023-08-31 PROCEDURE — 97110 THERAPEUTIC EXERCISES: CPT | Mod: PN

## 2023-08-31 NOTE — PROGRESS NOTES
Physical Therapy Treatment Note     Name: Elaine Sadler  Clinic Number: [id number]    Therapy Diagnosis:   Encounter Diagnoses   Name Primary?    Decreased range of motion of trunk and back Yes    Bilateral chronic knee pain     Other spondylosis, lumbar region        Physician: Hugh Rosenthal MD    Visit Date: 8/31/2023    Physician Orders: PT Eval and Treat    Medical Diagnosis from Referral: arthrodesis L5 s1 , lumbar spondylosis   Evaluation Date: 8/2/2023  Authorization Period Expiration: 10/2/23  Plan of Care Expiration: see wellcare   Visit # / Visits authorized: 10/ 11  PTA Visit #:   Time In: 845  Time Out: 940  Total Billable Time: 45 minutes    Precautions: Standard  Plan of care reviewed with Andrew Allen PT.      Subjective     Pt reports:no back pain today, just lateral left thigh , and lateral knee   She was compliant with home exercise program.  Response to previous treatment: sore  Functional change: better sleep     Pain: 0/10  Location: bilateral back      Objective     Elaine received therapeutic exercises to develop strength, endurance, ROM, flexibility, posture, and core stabilization for 20 minutes including:  Bike x 6'  Swissball knee flexion x 10   Swissball trunk rotation x 10  Bilateral single knee to chest/ piriformis x 5  Bridging x 10  Supine hip abd /add stretch x 10 reps   Partial sit ups x 10    Range of motion   Hip flexion   right 111      left   110  Hamstring  right  -15    left  -15       Elaine participated in neuromuscular re-education activities to improve: Posture for 17 minutes. The following activities were included:  Slant board x 2'  Bilateral hamstrings stretch on step x 5  Bilateral side bend stretch x 5  Hip adduction with bolster and pelvic tilt x 10  Bilateral supine hip adduction stretch x 5     Patient received US to left it band  at 2.0 w/cm2/1 mhz x 3' without adverse affects.    Elaine received the following supervised modalities after being cleared for  contradictions: IFC Electrical Stimulation:  Elaine received IFC Electrical Stimulation for pain control applied to the low back. Pt received stimulation at  100% scan at a frequency 18 of  for 20 minutes. Elaine tolderated treatment well without any adverse effects.      Elaine received hot pack for  20 minutes to low back.    Home Exercises Provided and Patient Education Provided     Education provided: home exercise program     Written Home Exercises Provided: Patient instructed to cont prior HEP.  Exercises were reviewed and Elaine was able to demonstrate them prior to the end of the session.  Elaine demonstrated good  understanding of the education provided.     See EMR under Patient Instructions for exercises provided prior visit.    Assessment     Patient voicing decreased muscle tightness after US . Pt voices excellent improvement from PT   Elaine Is progressing well towards her goals.   Pt prognosis is Excellent.     Pt will continue to benefit from skilled outpatient physical therapy to address the deficits listed in the problem list box on initial evaluation, provide pt/family education and to maximize pt's level of independence in the home and community environment.     Pt's spiritual, cultural and educational needs considered and pt agreeable to plan of care and goals.     Anticipated barriers to physical therapy: compliance with home exercise program     Goals:  Short Term Goals: 4 weeks   Pt will be independent with home ex program -met  Pt will be able to increase hip flexion to 110 degress   Be able to increase lower strength to 4/5  Decrease resting pain to 0/10-met     Long Term Goals: 8  weeks   Pt will have 0/10 with household activity   Be able to stand and cook pain free     Plan     Plan of care Certification: 8/2/2023 to 10/02/2023 .     Outpatient Physical Therapy 2 times weekly for 8 weeks to include the following interventions: Electrical Stimulation ifc , Manual Therapy, Patient  Education, Therapeutic Exercise, and modalities as needed .    Plan of care has been reestablished with Trice DENNISON, Peggy Villegas LPTA, Brittany Krause LPTA  and Sujatha DENNISON.     Andrew Allen, PT  8/31/2023

## 2023-09-06 ENCOUNTER — CLINICAL SUPPORT (OUTPATIENT)
Dept: REHABILITATION | Facility: HOSPITAL | Age: 68
End: 2023-09-06
Payer: COMMERCIAL

## 2023-09-06 DIAGNOSIS — G89.29 BILATERAL CHRONIC KNEE PAIN: ICD-10-CM

## 2023-09-06 DIAGNOSIS — M25.69 DECREASED RANGE OF MOTION OF TRUNK AND BACK: Primary | ICD-10-CM

## 2023-09-06 DIAGNOSIS — M25.561 BILATERAL CHRONIC KNEE PAIN: ICD-10-CM

## 2023-09-06 DIAGNOSIS — M25.562 BILATERAL CHRONIC KNEE PAIN: ICD-10-CM

## 2023-09-06 PROCEDURE — 97112 NEUROMUSCULAR REEDUCATION: CPT | Mod: PN,CQ

## 2023-09-06 PROCEDURE — 97014 ELECTRIC STIMULATION THERAPY: CPT | Mod: PN,CQ

## 2023-09-06 PROCEDURE — 97110 THERAPEUTIC EXERCISES: CPT | Mod: PN,CQ

## 2023-09-06 NOTE — PROGRESS NOTES
Physical Therapy Treatment Note     Name: Elaine Sadler  Clinic Number: [id number]    Therapy Diagnosis:   Encounter Diagnoses   Name Primary?    Decreased range of motion of trunk and back Yes    Bilateral chronic knee pain        Physician: Hugh Rosenthal MD    Visit Date: 9/6/2023    Physician Orders: PT Eval and Treat    Medical Diagnosis from Referral: arthrodesis L5 s1 , lumbar spondylosis   Evaluation Date: 8/2/2023  Authorization Period Expiration: 10/2/23  Plan of Care Expiration: see [employer]   Visit # / Visits authorized: 11/ 11  PTA Visit #: 1  Time In: 842  Time Out: 928  Total Billable Time: 46 minutes    Precautions: Standard  Plan of care reviewed with Andrew Allen PT.      Subjective     Pt reports:no back pain today, states knees are sore and they hurt. States knees are a dull pain. Reports the left is worse.    She was compliant with home exercise program.  Response to previous treatment: sore  Functional change: decreased difficulty with gait    Pain: 0/10  Location: bilateral back      Objective     Elaine received therapeutic exercises to develop strength, endurance, ROM, flexibility, posture, and core stabilization for 20 minutes including:  Bike x 6'  Swissball knee flexion x 10   Swissball trunk rotation x 10  Bilateral single knee to chest/ piriformis x 5  Bridging x 10  Supine hip abd /add stretch x 10 reps   Partial sit ups x 10    Range of motion   Hip flexion   right 115      left   116  Hamstring  right  -10    left  -9       Elaine participated in neuromuscular re-education activities to improve: Posture for 17 minutes. The following activities were included:  Slant board x 2'  Bilateral hamstrings stretch on step  3 x 30 seconds each  Bilateral side bend stretch x 5  Hip adduction with bolster and pelvic tilt x 10  Bilateral supine hip adduction stretch x 5     Patient received US to left it band  at 2.0 w/cm2/1 mhz x 0' without adverse affects. (Not today)    Elaine received the  following supervised modalities after being cleared for contradictions: IFC Electrical Stimulation:  Elaine received IFC Electrical Stimulation for pain control applied to the low back. Pt received stimulation at  100% scan at a frequency 18 of  for 20 minutes. Elaine tolderated treatment well without any adverse effects.      Elaine received hot pack for  20 minutes to low back.    Home Exercises Provided and Patient Education Provided     Education provided: home exercise program     Written Home Exercises Provided: Patient instructed to cont prior HEP.  Exercises were reviewed and Elaine was able to demonstrate them prior to the end of the session.  Elaine demonstrated good  understanding of the education provided.     See EMR under Patient Instructions for exercises provided prior visit.    Assessment     Patient displays improved AROM in bilateral hips and hamstrings this visit. Pt voices excellent improvement from PT.   Elaine Is progressing well towards her goals.   Pt prognosis is Excellent.     Pt will continue to benefit from skilled outpatient physical therapy to address the deficits listed in the problem list box on initial evaluation, provide pt/family education and to maximize pt's level of independence in the home and community environment.     Pt's spiritual, cultural and educational needs considered and pt agreeable to plan of care and goals.     Anticipated barriers to physical therapy: compliance with home exercise program     Goals:  Short Term Goals: 4 weeks   Pt will be independent with home ex program -met  Pt will be able to increase hip flexion to 110 degress   Be able to increase lower strength to 4/5  Decrease resting pain to 0/10-met     Long Term Goals: 8  weeks   Pt will have 0/10 with household activity   Be able to stand and cook pain free     Plan     Plan of care Certification: 8/2/2023 to 10/02/2023 .     Outpatient Physical Therapy 2 times weekly for 8 weeks to include the  following interventions: Electrical Stimulation ifc , Manual Therapy, Patient Education, Therapeutic Exercise, and modalities as needed .    Plan of care has been reestablished with Trice DENNISON, Peggy DENNISON, Brittany Krause LPTA  and Sujatha DENNISON.     Brittany Krause, PTA  9/6/2023

## 2023-09-06 NOTE — PLAN OF CARE
Physical Therapy Treatment Note      Name: Elaine Sadler  Clinic Number: [id number]     Therapy Diagnosis:        Encounter Diagnoses   Name Primary?    Decreased range of motion of trunk and back Yes    Bilateral chronic knee pain           Physician: Hugh Rosenthal MD     Visit Date: 9/6/2023     Physician Orders: PT Eval and Treat    Medical Diagnosis from Referral: arthrodesis L5 s1 , lumbar spondylosis   Evaluation Date: 8/2/2023  Authorization Period Expiration: 10/2/23  Plan of Care Expiration: see [employer]   Visit # / Visits authorized: 11/ 11  PTA Visit #: 1  Time In: 842  Time Out: 928  Total Billable Time: 46 minutes     Precautions: Standard  Plan of care reviewed with Andrew Allen PT.       Subjective      Pt reports:no back pain today, states knees are sore and they hurt. States knees are a dull pain. Reports the left is worse.    She was compliant with home exercise program.  Response to previous treatment: sore  Functional change: decreased difficulty with gait     Pain: 0/10  Location: bilateral back       Objective      Elaine received therapeutic exercises to develop strength, endurance, ROM, flexibility, posture, and core stabilization for 20 minutes including:  Bike x 6'  Swissball knee flexion x 10   Swissball trunk rotation x 10  Bilateral single knee to chest/ piriformis x 5  Bridging x 10  Supine hip abd /add stretch x 10 reps   Partial sit ups x 10     Range of motion   Hip flexion   right 115      left   116  Hamstring  right  -10    left  -9        Elaine participated in neuromuscular re-education activities to improve: Posture for 17 minutes. The following activities were included:  Slant board x 2'  Bilateral hamstrings stretch on step  3 x 30 seconds each  Bilateral side bend stretch x 5  Hip adduction with bolster and pelvic tilt x 10  Bilateral supine hip adduction stretch x 5     Patient received US to left it band  at 2.0 w/cm2/1 mhz x 0' without adverse affects. (Not today)      Elaine received the following supervised modalities after being cleared for contradictions: IFC Electrical Stimulation:  Elaine received IFC Electrical Stimulation for pain control applied to the low back. Pt received stimulation at  100% scan at a frequency 18 of  for 20 minutes. Elaine tolderated treatment well without any adverse effects.       Elaine received hot pack for  20 minutes to low back.     Home Exercises Provided and Patient Education Provided      Education provided: home exercise program      Written Home Exercises Provided: Patient instructed to cont prior HEP.  Exercises were reviewed and Elaine was able to demonstrate them prior to the end of the session.  Elaine demonstrated good  understanding of the education provided.      See EMR under Patient Instructions for exercises provided prior visit.     Assessment      Patient displays improved AROM in bilateral hips and hamstrings this visit. Pt voices excellent improvement from PT.   Elaine Is progressing well towards her goals.   Pt prognosis is Excellent.      Pt will continue to benefit from skilled outpatient physical therapy to address the deficits listed in the problem list box on initial evaluation, provide pt/family education and to maximize pt's level of independence in the home and community environment.      Pt's spiritual, cultural and educational needs considered and pt agreeable to plan of care and goals.     Anticipated barriers to physical therapy: compliance with home exercise program      Goals:  Short Term Goals: 4 weeks   Pt will be independent with home ex program -met  Pt will be able to increase hip flexion to 110 degress   Be able to increase lower strength to 4/5  Decrease resting pain to 0/10-met     Long Term Goals: 8  weeks   Pt will have 0/10 with household activity   Be able to stand and cook pain free      Plan      Outpatient Therapy Discharge Summary     Name: Elaine DÍAZ Strawn  Clinic Number: [id number]    Therapy  Diagnosis:   Encounter Diagnoses   Name Primary?    Decreased range of motion of trunk and back Yes    Bilateral chronic knee pain      Physician: Hugh Rosenthal MD         Assessment    Goals:  Pt met goals did excellent with PT     Discharge reason: Patient has met all of his/her goals and Patient has completed allowable visits authorized by insurance    Plan   This patient is discharged from Physical Therapy.    Andrew Allen, PT

## 2024-02-26 DIAGNOSIS — Z96.651 HISTORY OF TOTAL KNEE ARTHROPLASTY, RIGHT: Primary | ICD-10-CM

## 2024-03-14 ENCOUNTER — CLINICAL SUPPORT (OUTPATIENT)
Dept: REHABILITATION | Facility: HOSPITAL | Age: 69
End: 2024-03-14
Payer: COMMERCIAL

## 2024-03-14 DIAGNOSIS — M25.661 DECREASED ROM OF RIGHT KNEE: ICD-10-CM

## 2024-03-14 DIAGNOSIS — Z96.651 HISTORY OF TOTAL KNEE ARTHROPLASTY, RIGHT: Primary | ICD-10-CM

## 2024-03-14 DIAGNOSIS — Z74.09 DECREASED FUNCTIONAL MOBILITY AND ENDURANCE: ICD-10-CM

## 2024-03-14 PROCEDURE — 97110 THERAPEUTIC EXERCISES: CPT | Mod: PN

## 2024-03-14 PROCEDURE — 97161 PT EVAL LOW COMPLEX 20 MIN: CPT | Mod: PN

## 2024-03-14 NOTE — PLAN OF CARE
[facility]   Physical Therapy Initial Evaluation    Date: 3/14/2024   Name: Elaine Sadler  Clinic Number: [id number]    Therapy Diagnosis:   Encounter Diagnoses   Name Primary?    History of total knee arthroplasty, right Yes    Decreased ROM of right knee     Decreased functional mobility and endurance      Physician: Chance Ross MD    Physician Orders: PT Eval and Treat    Medical Diagnosis from Referral: right total knee replacement 2/28/2024  Evaluation Date: 3/14/2024  Updated Plan of Care Due : 5/14/2024  Authorization Period Expiration: [employer] 5/17/2024  Plan of Care Expiration: see [facility]   Visit # / Visits authorized: 1/ 13    Time In: 1010  Time Out: 1100  Total Appointment Time (timed & untimed codes): 50 minutes    Precautions: Standard    Subjective   Date of onset: pt states she had right total knee replacement right on 2/28/2024 . Pt states she is improving but still having difficulty at end motion of knee bending and difficulty straightening it all the way out . Pt states she has pain and stiffness.  Pt voices she would like to be able to return to yard work and house work .   History of current condition - Elaine reports: hx below      Medical History:   No past medical history on file.    Surgical History:   Elaine Sadler  has no past surgical history on file.    Medications:   Elaine currently has no medications in their medication list.    Allergies:   Review of patient's allergies indicates:  Not on File     Imaging, bone scan films:      Prior Therapy: pt had home health   Social History:   lives with their spouse  Occupation: retired   Prior Level of Function: independent chronic knee   Current Level of Function: decreased knee range of motion and strength     Pain:  Current 4/10, worst 9/10, best 1/10   Location: right joint and knee  generalized   Description: Aching, Dull, Grabbing, Tight, and Deep  Aggravating Factors: Sitting, Standing, Bending, Touching, Morning,  Extension, and Flexing  Easing Factors: rest    Patients goals: be able to return to house work , stoop and bend pain free     Objective         Observation :     Pronation/Supination : Right                                           Left     Incision :    dressing intact with wound vac on . Pt to call MD and see if she can remove.          Comments :         Range of Motion/Strength :                  Left Extremity                                                                        Right Extremity   AROM PROM Strength  Location  AROM    PROM   Strength   115  5   Hip      Flexion 110  4                                        0   Quad lag with terminal knee extension  -25     115 120 4   Knee    Flexion 100 110 3-   0  4                Extension -15     10  4   Ankle   Dorsiflexion 10  4                     Plantar Flexion                        Inversion                        Eversion                     Functional Impairments :  decreased knee range of motion and strength      Limitation/Restriction for FOTO knee Survey    Therapist reviewed FOTO scores for Elaine Sadler on 3/14/2024.   FOTO documents entered into [employer] - see Media section.    Limitation Score: 20%         TREATMENT         Elaine received the treatments listed below:  THERAPEUTIC EXERCISES to develop strength, endurance, ROM, and flexibility for 20  minutes including home ex program         Home Exercises and Patient Education Provided    Education provided:   - Pt performed and received home ex program.     Written Home Exercises Provided: yes.  Exercises were reviewed and Elaine was able to demonstrate them prior to the end of the session.  Elanie demonstrated good  understanding of the education provided.     See EMR under Patient Instructions for exercises provided 3/14/2024.    Assessment   Elaine is a 68 y.o. female referred to outpatient Physical Therapy with a medical diagnosis of right total knee replacement . Patient presents with  decreased knee flexion and extension, decreased quad lag . Pt ambulating with rolling walker.     Patient prognosis is Excellent.   Patientt will benefit from skilled outpatient Physical Therapy to address the deficits stated above and in the chart below, provide patient /family education, and to maximize patientt's level of independence.     Plan of care discussed with patient: Yes  Patient's spiritual, cultural and educational needs considered and patient is agreeable to the plan of care and goals as stated below:     Anticipated Barriers for therapy: medical diagnosis of right total knee replacement . Patient presents with decreased knee flexion and extension, decreased quad lag . Pt ambulating with rolling walker.     Goals:  Short Term Goals: 4 weeks   Pt will be independent with home ex program   Pt will be able to progress to straight cane with gait.   Be able to increase knee range of motion 0-130   Increase quad lag to 0 degrees     Long Term Goals: 8  weeks   Ambulate independently without an assistive device.   Be able to perform house work pain free   Be able to stoop and bend pain free     Plan   Plan of care Certification: 3/14/2024 to 5/17/2024.    Outpatient Physical Therapy 2 times weekly for 8 weeks to include the following interventions: Electrical Stimulation nmes, Manual Therapy, Neuromuscular Re-ed, Patient Education, Therapeutic Activities, and Therapeutic Exercise.     Plan of care has been reestablished with Trice Pathak LPTA, Peggy Villegas LPTA, Brittany Krause LPTA  and Sujatha Santana LPTA.       Andrew Allen, PT             I CERTIFY THE NEED FOR THESE SERVICES FURNISHED UNDER THIS PLAN OF TREATMENT AND WHILE UNDER MY CARE.    Physician's comments:      Physician's Signature: ___________________________________________________

## 2024-03-19 ENCOUNTER — CLINICAL SUPPORT (OUTPATIENT)
Dept: REHABILITATION | Facility: HOSPITAL | Age: 69
End: 2024-03-19
Payer: COMMERCIAL

## 2024-03-19 DIAGNOSIS — Z96.651 HISTORY OF TOTAL KNEE ARTHROPLASTY, RIGHT: Primary | ICD-10-CM

## 2024-03-19 DIAGNOSIS — R29.898 DECREASED STRENGTH INVOLVING KNEE JOINT: ICD-10-CM

## 2024-03-19 DIAGNOSIS — M25.661 DECREASED ROM OF RIGHT KNEE: ICD-10-CM

## 2024-03-19 PROCEDURE — 97530 THERAPEUTIC ACTIVITIES: CPT | Mod: PN,CQ

## 2024-03-19 PROCEDURE — 97112 NEUROMUSCULAR REEDUCATION: CPT | Mod: PN,CQ

## 2024-03-19 NOTE — PROGRESS NOTES
Physical Therapy Treatment Note     Name: Elaine Sadler  Clinic Number: [id number]    Therapy Diagnosis: No diagnosis found.  Physician: Chance Ross MD    Visit Date: 3/19/2024  Physician Orders: PT Eval and Treat    Medical Diagnosis from Referral: right total knee replacement 2/28/2024  Evaluation Date: 3/14/2024  Updated Plan of Care Due : 5/14/2024  Authorization Period Expiration: [employer] 5/17/2024  Plan of Care Expiration: see [employer]   Visit # / Visits authorized: 2/ 13  PTA Visit #: 1    Time In: 1045  Time Out: 1130  Total Billable Time: 45 minutes    Precautions: Standard      Subjective     Pt reports: I had an awful weekend with the pain, its a constant throb. I saw Dr Ross FNP yesterday and discussed the pain and she just told me to take tylenol..  She was compliant with home exercise program.  Response to previous treatment: sore  Functional change: ongoing    Pain: 5/10  Location: right knee      Objective       Range of motion   Flexion 114  Gravity assisted active range of motion -13  Quad lag with terminal knee extension  - 19    Elaine received the following manual therapy techniques: prom were applied to the: right knee for 3' minutes, including:      Elaine participated in neuromuscular re-education activities to improve: Balance, Coordination, Proprioception, and Posture for 21 minutes. The following activities were included:  Slant board x 2' with glue set  Swissball knee flexion nerve glide x 15  Quad set with electrical stimulation x 15   Terminal knee extension with belt electrical stimulation x 20    Elaine participated in dynamic functional therapeutic activities to improve functional performance for 18  minutes, including:  Double support squats total gym x 20 to simulate bending and squatting  Double support calf raises total gym x 15 to simulate reaching on tip toes   Double support leg presses 20 x 60# to promote bending and squatting  Double support calf presses 20 x 60# to  promote reaching on tip toes    Elaine participated in gait training to improve functional mobility and safety for 3' through out clinic with straight cane with contact guard assistance x  1    Home Exercises Provided and Patient Education Provided     Education provided: home exercise program     Written Home Exercises Provided: Patient instructed to cont prior HEP.  Exercises were reviewed and Elaine was able to demonstrate them prior to the end of the session.  Elaine demonstrated good  understanding of the education provided.     See EMR under Patient Instructions for exercises provided prior visit.    Assessment     Patient not able to demonstrate safe gait with straight can yet, instructed to continue with rolling walker. Patient unable to tolerate leaving knee straight with quad set without resting with bent. Patient instructed to put towel roll under ankle on foot rest when in recliner to promote extension range of motion.  Elaine Is progressing well towards her goals.   Pt prognosis is Excellent.     Pt will continue to benefit from skilled outpatient physical therapy to address the deficits listed in the problem list box on initial evaluation, provide pt/family education and to maximize pt's level of independence in the home and community environment.     Pt's spiritual, cultural and educational needs considered and pt agreeable to plan of care and goals.     Anticipated barriers to physical therapy: compliance with home exercise program     Goals:  Short Term Goals: 4 weeks   Pt will be independent with home ex program   Pt will be able to progress to straight cane with gait.   Be able to increase knee range of motion 0-130   Increase quad lag to 0 degrees      Long Term Goals: 8  weeks   Ambulate independently without an assistive device.   Be able to perform house work pain free   Be able to stoop and bend pain free   Plan     Plan of care Certification: 3/14/2024 to 5/17/2024.     Outpatient Physical  Therapy 2 times weekly for 8 weeks to include the following interventions: Electrical Stimulation nmes, Manual Therapy, Neuromuscular Re-ed, Patient Education, Therapeutic Activities, and Therapeutic Exercise. Plan of care reviewed with Andrew Allen PT.    Kae Pathak, PTA  3/19/2024

## 2024-03-21 ENCOUNTER — CLINICAL SUPPORT (OUTPATIENT)
Dept: REHABILITATION | Facility: HOSPITAL | Age: 69
End: 2024-03-21
Payer: COMMERCIAL

## 2024-03-21 DIAGNOSIS — R29.898 DECREASED STRENGTH INVOLVING KNEE JOINT: Primary | ICD-10-CM

## 2024-03-21 PROCEDURE — 97112 NEUROMUSCULAR REEDUCATION: CPT | Mod: PN,CQ

## 2024-03-21 PROCEDURE — 97530 THERAPEUTIC ACTIVITIES: CPT | Mod: PN,CQ

## 2024-03-21 NOTE — PROGRESS NOTES
Physical Therapy Treatment Note     Name: Elaine Sadler  Clinic Number: [id number]    Therapy Diagnosis:   Encounter Diagnosis   Name Primary?    Decreased strength involving knee joint Yes     Physician: Chance Ross MD    Visit Date: 3/21/2024  Physician Orders: PT Eval and Treat    Medical Diagnosis from Referral: right total knee replacement 2/28/2024  Evaluation Date: 3/14/2024  Updated Plan of Care Due : 5/14/2024  Authorization Period Expiration: [employer] 5/17/2024  Plan of Care Expiration: see [employer]   Visit # / Visits authorized: 3/ 13  PTA Visit #: 2    Time In: 920  Time Out: 1005  Total Billable Time: 45 minutes    Precautions: Standard    Subjective     Pt reports: I'm in better spirits today.  She was compliant with home exercise program.  Response to previous treatment: sore  Functional change: ongoing    Pain: 1/10  Location: right knee      Objective       Range of motion   Flexion 116  Gravity assisted active range of motion -13  Quad lag with terminal knee extension  - 16    Elaine participated in neuromuscular re-education activities to improve: Balance, Coordination, Proprioception, and Posture for 20 minutes. The following activities were included:  Slant board x 2' with glue set  Swissball knee flexion nerve glide x 15  Quad set with electrical stimulation x 15   Terminal knee extension  electrical stimulation x 20    Elaine participated in dynamic functional therapeutic activities to improve functional performance for 25  minutes, including:  Double support squats total gym x 20 to simulate bending and squatting  Double support calf raises total gym x 15 to simulate reaching on tip toes   Right single support leg presses 20 x 45# to simulate bending and squatting  Double support calf presses 20 x 60# to promote reaching on tip toes  Biking x 5' seat level 3 to increase endurance with walking  Treadmill walking 1.0 mph x 3' to increase endurance and brandon with ambulation    Home  Exercises Provided and Patient Education Provided     Education provided: home exercise program     Written Home Exercises Provided: Patient instructed to cont prior HEP.  Exercises were reviewed and Elaine was able to demonstrate them prior to the end of the session.  Elaine demonstrated good  understanding of the education provided.     See EMR under Patient Instructions for exercises provided prior visit.    Assessment     Patient had improved brandon and weight shifting onto right lower extremity after treadmill. Patient incision healing well, steri-strips falling off.  Elaine Is progressing well towards her goals.   Pt prognosis is Excellent.     Pt will continue to benefit from skilled outpatient physical therapy to address the deficits listed in the problem list box on initial evaluation, provide pt/family education and to maximize pt's level of independence in the home and community environment.     Pt's spiritual, cultural and educational needs considered and pt agreeable to plan of care and goals.     Anticipated barriers to physical therapy: compliance with home exercise program     Goals:  Short Term Goals: 4 weeks   Pt will be independent with home ex program   Pt will be able to progress to straight cane with gait.   Be able to increase knee range of motion 0-130   Increase quad lag to 0 degrees      Long Term Goals: 8  weeks   Ambulate independently without an assistive device.   Be able to perform house work pain free   Be able to stoop and bend pain free   Plan     Plan of care Certification: 3/14/2024 to 5/17/2024.     Outpatient Physical Therapy 2 times weekly for 8 weeks to include the following interventions: Electrical Stimulation nmes, Manual Therapy, Neuromuscular Re-ed, Patient Education, Therapeutic Activities, and Therapeutic Exercise. Plan of care reviewed with Andrew Allen, PT.    Kae Pathak, PTA  3/21/2024

## 2024-03-25 ENCOUNTER — CLINICAL SUPPORT (OUTPATIENT)
Dept: REHABILITATION | Facility: HOSPITAL | Age: 69
End: 2024-03-25
Payer: COMMERCIAL

## 2024-03-25 DIAGNOSIS — M25.661 DECREASED ROM OF RIGHT KNEE: ICD-10-CM

## 2024-03-25 DIAGNOSIS — R29.898 DECREASED STRENGTH INVOLVING KNEE JOINT: Primary | ICD-10-CM

## 2024-03-25 DIAGNOSIS — Z96.651 HISTORY OF TOTAL KNEE ARTHROPLASTY, RIGHT: ICD-10-CM

## 2024-03-25 PROCEDURE — 97112 NEUROMUSCULAR REEDUCATION: CPT | Mod: PN

## 2024-03-25 PROCEDURE — 97530 THERAPEUTIC ACTIVITIES: CPT | Mod: PN

## 2024-03-25 NOTE — PROGRESS NOTES
Physical Therapy Treatment Note     Name: Elaine Sadler  Clinic Number: [id number]    Therapy Diagnosis:   Encounter Diagnoses   Name Primary?    Decreased strength involving knee joint Yes    Decreased ROM of right knee     History of total knee arthroplasty, right      Physician: Chance Ross MD    Visit Date: 3/25/2024  Physician Orders: PT Eval and Treat    Medical Diagnosis from Referral: right total knee replacement 2/28/2024  Evaluation Date: 3/14/2024  Updated Plan of Care Due : 5/14/2024  Authorization Period Expiration: [employer] 5/17/2024  Plan of Care Expiration: see [employer]   Visit # / Visits authorized: 4/ 13  PTA Visit #:     Time In: 920  Time Out: 1005  Total Billable Time: 45 minutes    Precautions: Standard    Subjective     Pt reports: I'm in better spirits today.  She was compliant with home exercise program.  Response to previous treatment: sore  Functional change: ongoing    Pain: 1/10  Location: right knee      Objective       Range of motion   Flexion 125  Gravity assisted active range of motion -5  Quad lag with terminal knee extension  - 10      Elaine participated in neuromuscular re-education activities to improve: Balance, Coordination, Proprioception, and Posture for 20 minutes. The following activities were included:  Slant board x 2' with glue set  Swissball knee flexion nerve glide x 15  Quad set with electrical stimulation x 15   Terminal knee extension  electrical stimulation x 20    Elaine participated in dynamic functional therapeutic activities to improve functional performance for 25  minutes, including:  Double support squats total gym x 20 to simulate bending and squatting  Double support calf raises total gym x 15 to simulate reaching on tip toes   Right single support leg presses 20 x 45# to simulate bending and squatting  Double support calf presses 20 x 60# to promote reaching on tip toes  Biking x 5' seat level 3 to increase endurance with walking  Treadmill walking  1.0 mph x 3' to increase endurance and brandon with ambulation    Home Exercises Provided and Patient Education Provided     Education provided: home exercise program     Written Home Exercises Provided: Patient instructed to cont prior HEP.  Exercises were reviewed and Elaine was able to demonstrate them prior to the end of the session.  Elaine demonstrated good  understanding of the education provided.     See EMR under Patient Instructions for exercises provided prior visit.    Assessment    Pt improving with extension today   Elaine Is progressing well towards her goals.   Pt prognosis is Excellent.     Pt will continue to benefit from skilled outpatient physical therapy to address the deficits listed in the problem list box on initial evaluation, provide pt/family education and to maximize pt's level of independence in the home and community environment.     Pt's spiritual, cultural and educational needs considered and pt agreeable to plan of care and goals.     Anticipated barriers to physical therapy: compliance with home exercise program     Goals:  Short Term Goals: 4 weeks   Pt will be independent with home ex program   Pt will be able to progress to straight cane with gait.   Be able to increase knee range of motion 0-130   Increase quad lag to 0 degrees      Long Term Goals: 8  weeks   Ambulate independently without an assistive device.   Be able to perform house work pain free   Be able to stoop and bend pain free   Plan     Plan of care Certification: 3/14/2024 to 5/17/2024.     Outpatient Physical Therapy 2 times weekly for 8 weeks to include the following interventions: Electrical Stimulation nmes, Manual Therapy, Neuromuscular Re-ed, Patient Education, Therapeutic Activities, and Therapeutic Exercise. Plan of care reviewed with Andrew Allen, PT.    Andrew Allen, PT  3/25/2024

## 2024-03-28 ENCOUNTER — CLINICAL SUPPORT (OUTPATIENT)
Dept: REHABILITATION | Facility: HOSPITAL | Age: 69
End: 2024-03-28
Payer: COMMERCIAL

## 2024-03-28 DIAGNOSIS — M25.661 DECREASED ROM OF RIGHT KNEE: Primary | ICD-10-CM

## 2024-03-28 DIAGNOSIS — Z96.651 HISTORY OF TOTAL KNEE ARTHROPLASTY, RIGHT: ICD-10-CM

## 2024-03-28 PROCEDURE — 97112 NEUROMUSCULAR REEDUCATION: CPT | Mod: PN,CQ

## 2024-03-28 PROCEDURE — 97110 THERAPEUTIC EXERCISES: CPT | Mod: PN,CQ

## 2024-03-28 NOTE — PROGRESS NOTES
Physical Therapy Treatment Note     Name: Elaine Sadler  Clinic Number: [id number]    Therapy Diagnosis:   Encounter Diagnoses   Name Primary?    Decreased ROM of right knee Yes    History of total knee arthroplasty, right      Physician: Chance Ross MD    Visit Date: 3/28/2024  Physician Orders: PT Eval and Treat    Medical Diagnosis from Referral: right total knee replacement 2/28/2024  Evaluation Date: 3/14/2024  Updated Plan of Care Due : 5/14/2024  [facility]  Plan of Care Expiration: see [employer]   Visit # / Visits authorized: 5/ 13  PTA Visit #: 1    Time In: 1000  Time Out: 1045  Total Billable Time: 45 minutes    Precautions: Standard    Subjective     Pt reports:I'm sore, but I figure it'll be that way for a while.  She was compliant with home exercise program.  Response to previous treatment: sore  Functional change: ongoing    Pain: 2/10  Location: right knee      Objective     Range of motion   Flexion 125  Gravity assisted active range of motion -5  Quad lag with terminal knee extension  - 12      Elaine participated in neuromuscular re-education activities to improve: Balance, Coordination, Proprioception, and Posture for 20 minutes. The following activities were included:  Slant board x 2' with glue set  Swissball knee flexion nerve glide x 15  Quad set with electrical stimulation x 15   Terminal knee extension  electrical stimulation x 20  Right sitting hamstrings stretch x 5    Elaine participated in dynamic functional therapeutic activities to improve functional performance for 25  minutes, including:  Double support squats total gym x 20 to simulate bending and squatting  Double support calf raises total gym x 15 to simulate reaching on tip toes   Right single support leg presses 20 x 45# to simulate bending and squatting  Double support calf presses 20 x 60# to promote reaching on tip toes  Biking x 5' seat level 0 to increase endurance with  walking  Treadmill walking 1.3 mph x 4' to increase endurance and brandon with ambulation    Home Exercises Provided and Patient Education Provided     Education provided: home exercise program     Written Home Exercises Provided: Patient instructed to cont prior HEP.  Exercises were reviewed and Elaine was able to demonstrate them prior to the end of the session.  Elaine demonstrated good  understanding of the education provided.     See EMR under Patient Instructions for exercises provided prior visit.    Assessment    Pt still unable to tolerate leaving leg fully extended with quad set without frequent breaks, noted to have increased edema along posterior knee.  Elaine Is progressing well towards her goals.   Pt prognosis is Excellent.     Pt will continue to benefit from skilled outpatient physical therapy to address the deficits listed in the problem list box on initial evaluation, provide pt/family education and to maximize pt's level of independence in the home and community environment.     Pt's spiritual, cultural and educational needs considered and pt agreeable to plan of care and goals.     Anticipated barriers to physical therapy: compliance with home exercise program     Goals:  Short Term Goals: 4 weeks   Pt will be independent with home ex program   Pt will be able to progress to straight cane with gait.   Be able to increase knee range of motion 0-130   Increase quad lag to 0 degrees      Long Term Goals: 8  weeks   Ambulate independently without an assistive device.   Be able to perform house work pain free   Be able to stoop and bend pain free   Plan     Plan of care Certification: 3/14/2024 to 5/17/2024.     Outpatient Physical Therapy 2 times weekly for 8 weeks to include the following interventions: Electrical Stimulation nmes, Manual Therapy, Neuromuscular Re-ed, Patient Education, Therapeutic Activities, and Therapeutic Exercise. Plan of care reviewed with Andrew Allen PT.    Kae MILLIGAN  Lawson, PTA  3/28/2024

## 2024-04-03 ENCOUNTER — CLINICAL SUPPORT (OUTPATIENT)
Dept: REHABILITATION | Facility: HOSPITAL | Age: 69
End: 2024-04-03
Payer: COMMERCIAL

## 2024-04-03 DIAGNOSIS — Z96.651 HISTORY OF TOTAL KNEE ARTHROPLASTY, RIGHT: ICD-10-CM

## 2024-04-03 DIAGNOSIS — M25.661 DECREASED ROM OF RIGHT KNEE: Primary | ICD-10-CM

## 2024-04-03 PROCEDURE — 97530 THERAPEUTIC ACTIVITIES: CPT | Mod: PN,CQ

## 2024-04-03 PROCEDURE — 97112 NEUROMUSCULAR REEDUCATION: CPT | Mod: PN,CQ

## 2024-04-03 NOTE — PROGRESS NOTES
Physical Therapy Treatment Note     Name: Elaine Sadler  Clinic Number: [id number]    Therapy Diagnosis:   Encounter Diagnoses   Name Primary?    Decreased ROM of right knee Yes    History of total knee arthroplasty, right      Physician: Chance Ross MD    Visit Date: 4/3/2024  Physician Orders: PT Eval and Treat    Medical Diagnosis from Referral: right total knee replacement 2/28/2024  Evaluation Date: 3/14/2024  Updated Plan of Care Due : 5/14/2024  Authorization Period Expiration: [employer] 5/17/2024  Plan of Care Expiration: see [employer]   Visit # / Visits authorized: 6/ 13  PTA Visit #: 2    Time In: 920  Time Out: 1005  Total Billable Time: 45 minutes    Precautions: Standard    Subjective     Pt reports:I'm really stiff this am  She was compliant with home exercise program.  Response to previous treatment: sore  Functional change: ongoing    Pain: 2/10  Location: right knee      Objective     Range of motion   Flexion 125  Gravity assisted active range of motion -5  Quad lag with terminal knee extension  - 10      Elaine participated in neuromuscular re-education activities to improve: Balance, Coordination, Proprioception, and Posture for 20 minutes. The following activities were included:  Slant board x 2' with glue set  Swissball knee flexion nerve glide x 15  Quad set with electrical stimulation x 15   Terminal knee extension  electrical stimulation x 20  Right sitting hamstrings stretch x 5    Elaine participated in dynamic functional therapeutic activities to improve functional performance for 25  minutes, including:  Double support squats total gym x 20 to simulate bending and squatting  Double support calf raises total gym x 15 to simulate reaching on tip toes   Right single support leg presses 20 x 50# to simulate bending and squatting  Double support calf presses 20 x 60# to promote reaching on tip toes  Biking x 5' seat level 0 to increase endurance with walking  Treadmill walking 1.3 mph x 4'  to increase endurance and brandon with ambulation  Sit to stand without upper extremity support x 10 to simulate getting up off toilet    Home Exercises Provided and Patient Education Provided     Education provided: home exercise program     Written Home Exercises Provided: Patient instructed to cont prior HEP.  Exercises were reviewed and Elaine was able to demonstrate them prior to the end of the session.  Elaine demonstrated good  understanding of the education provided.     See EMR under Patient Instructions for exercises provided prior visit.    Assessment    Pt able to perform quad sets without needed rest period due to knee being extended this session, had slight improvement with quad lag with terminal knee extension range of motion.  Elaine Is progressing well towards her goals.   Pt prognosis is Excellent.     Pt will continue to benefit from skilled outpatient physical therapy to address the deficits listed in the problem list box on initial evaluation, provide pt/family education and to maximize pt's level of independence in the home and community environment.     Pt's spiritual, cultural and educational needs considered and pt agreeable to plan of care and goals.     Anticipated barriers to physical therapy: compliance with home exercise program     Goals:  Short Term Goals: 4 weeks   Pt will be independent with home ex program   Pt will be able to progress to straight cane with gait.   Be able to increase knee range of motion 0-130   Increase quad lag to 0 degrees      Long Term Goals: 8  weeks   Ambulate independently without an assistive device.   Be able to perform house work pain free   Be able to stoop and bend pain free   Plan     Plan of care Certification: 3/14/2024 to 5/17/2024.     Outpatient Physical Therapy 2 times weekly for 8 weeks to include the following interventions: Electrical Stimulation nmes, Manual Therapy, Neuromuscular Re-ed, Patient Education, Therapeutic Activities, and  Therapeutic Exercise. Plan of care reviewed with Andrew Allen PT.    Kae Pathak, PTA  4/3/2024

## 2024-04-05 ENCOUNTER — CLINICAL SUPPORT (OUTPATIENT)
Dept: REHABILITATION | Facility: HOSPITAL | Age: 69
End: 2024-04-05
Payer: COMMERCIAL

## 2024-04-05 DIAGNOSIS — R29.898 DECREASED STRENGTH INVOLVING KNEE JOINT: Primary | ICD-10-CM

## 2024-04-05 PROCEDURE — 97530 THERAPEUTIC ACTIVITIES: CPT | Mod: PN,CQ

## 2024-04-05 PROCEDURE — 97112 NEUROMUSCULAR REEDUCATION: CPT | Mod: PN,CQ

## 2024-04-05 NOTE — PROGRESS NOTES
"  Physical Therapy Treatment Note     Name: Elaine Sadler  Clinic Number: [id number]    Therapy Diagnosis:   Encounter Diagnosis   Name Primary?    Decreased strength involving knee joint Yes     Physician: Chance Ross MD    Visit Date: 4/5/2024  Physician Orders: PT Eval and Treat    Medical Diagnosis from Referral: right total knee replacement 2/28/2024  Evaluation Date: 3/14/2024  Updated Plan of Care Due : 5/14/2024  Authorization Period Expiration: [employer] 5/17/2024  Plan of Care Expiration: see [employer]   Visit # / Visits authorized: 7/ 13  PTA Visit #: 3    Time In: 0925  Time Out: 1015  Total Billable Time: 50 minutes     Precautions: Standard    Received Plan of Care per Andrew Allen PT     Subjective     Pt reports: pain or "ache" as noted in right knee, took a pain pill this morning  She was compliant with home exercise program.  Response to previous treatment: sore  Functional change: ongoing    Pain: 1/10  Location: right knee      Objective     Range of motion right knee:  Flexion   125 degrees   Extension       0 degrees   Quad lag    -5 degrees       Elaine participated in neuromuscular re-education activities to improve: Balance, Coordination, Proprioception, and Posture for 20 minutes. The following activities were included:  Slant board x 2' with glue set  Swissball knee flexion nerve glide x 15  Quad set with electrical stimulation x 15   Terminal knee extension electrical stimulation x 20  Right sitting hamstrings stretch x 5    Elaine participated in dynamic functional therapeutic activities to improve functional performance for 25  minutes, including:  Double support squats total gym x 20 to simulate bending and squatting  Double support calf raises total gym x 15 to simulate reaching on tip toes   Right single support leg presses 20 x 50# to simulate bending and squatting  Double support calf presses 20 x 60# to promote reaching on tip toes  Biking x 5' seat level 0 to increase endurance " with walking  Treadmill walking 1.3 mph x 4' to increase endurance and brandon with ambulation  Sit to stand without upper extremity support, 2 x 10 to simulate getting up off toilet    Home Exercises Provided and Patient Education Provided     Education provided: continue current home exercise program, pain free, along with pain management techniques    Written Home Exercises Provided: Patient instructed to cont prior HEP.  Exercises were reviewed and Elaine was able to demonstrate them prior to the end of the session.  Elaine demonstrated good  understanding of the education provided.     See EMR under Patient Instructions for exercises provided prior visit.    Assessment      Improved extension range of motion and quad strength as noted; limited by low back pain during session  Elaine Is progressing well towards her goals.   Pt prognosis is Excellent.     Pt will continue to benefit from skilled outpatient physical therapy to address the deficits listed in the problem list box on initial evaluation, provide pt/family education and to maximize pt's level of independence in the home and community environment.      Anticipated barriers to physical therapy: none    Goals:  Short Term Goals: 4 weeks   Pt will be independent with home ex program   Pt will be able to progress to straight cane with gait.   Be able to increase knee range of motion 0-130   Increase quad lag to 0 degrees      Long Term Goals: 8  weeks   Ambulate independently without an assistive device.   Be able to perform house work pain free   Be able to stoop and bend pain free     Plan     Plan of care Certification: 3/14/2024 to 5/17/2024.     Outpatient Physical Therapy 2 times weekly for 8 weeks to include the following interventions: Electrical Stimulation nmes, Manual Therapy, Neuromuscular Re-ed, Patient Education, Therapeutic Activities, and Therapeutic Exercise. Plan of care reviewed with Andrew Allen PT.      Continue per Plan of Care and  progress as pt able.  Peggy Villegas, PTA  4/5/2024

## 2024-04-09 ENCOUNTER — CLINICAL SUPPORT (OUTPATIENT)
Dept: REHABILITATION | Facility: HOSPITAL | Age: 69
End: 2024-04-09
Payer: COMMERCIAL

## 2024-04-09 DIAGNOSIS — R29.898 DECREASED STRENGTH INVOLVING KNEE JOINT: ICD-10-CM

## 2024-04-09 DIAGNOSIS — M25.661 DECREASED ROM OF RIGHT KNEE: ICD-10-CM

## 2024-04-09 DIAGNOSIS — Z96.651 HISTORY OF TOTAL KNEE ARTHROPLASTY, RIGHT: Primary | ICD-10-CM

## 2024-04-09 PROCEDURE — 97530 THERAPEUTIC ACTIVITIES: CPT | Mod: PN,CQ

## 2024-04-09 PROCEDURE — 97112 NEUROMUSCULAR REEDUCATION: CPT | Mod: PN,CQ

## 2024-04-09 NOTE — PROGRESS NOTES
Physical Therapy Treatment Note     Name: Elaine Sadler  Clinic Number: [id number]    Therapy Diagnosis:   Encounter Diagnoses   Name Primary?    History of total knee arthroplasty, right Yes    Decreased ROM of right knee     Decreased strength involving knee joint      Physician: Chance Ross MD    Visit Date: 4/9/2024  Physician Orders: PT Eval and Treat    Medical Diagnosis from Referral: right total knee replacement 2/28/2024  Evaluation Date: 3/14/2024  Updated Plan of Care Due : 5/14/2024  Authorization Period Expiration: [employer] 5/17/2024  Plan of Care Expiration: see [employer]   Visit # / Visits authorized: 8/ 13  PTA Visit #: 4    Time In: 1002  Time Out: 1042  Total Billable Time: 40 minutes     Precautions: Standard  Dr appointment 4/15/24    Subjective     Pt reports: I'm  stiff this am.My left knee is bothering me a good bit along with my back.  She was compliant with home exercise program.  Response to previous treatment: sore  Functional change: ongoing    Pain: 1/10  Location: right knee      Objective     Range of motion right knee:  Flexion   124 degrees   Extension      -3 degrees   Quad lag    -8 degrees     Elaine participated in neuromuscular re-education activities to improve: Balance, Coordination, Proprioception, and Posture for 20 minutes. The following activities were included:  Slant board x 2' with glue set  Swissball knee flexion nerve glide x 15  Quad set with electrical stimulation x 15   Terminal knee extension electrical stimulation 20 x 3#  Right sitting hamstrings stretch x 5  Right supine straight leg raise x 10     Elaine participated in dynamic functional therapeutic activities to improve functional performance for 20  minutes, including:  Double support squats total gym x 25 to simulate bending and squatting  Double support calf raises total gym x 15 to simulate reaching on tip toes   Right single support leg presses 20 x 55# to simulate bending and squatting  Double  "support calf presses 20 x 60# to promote reaching on tip toes  Biking x 5' seat level 0 to increase endurance with walking  Left lower extremity step up/down onto 6" step x 8    Home Exercises Provided and Patient Education Provided     Education provided: continue current home exercise program, pain free, along with pain management techniques    Written Home Exercises Provided: Patient instructed to cont prior HEP.  Exercises were reviewed and Elaine was able to demonstrate them prior to the end of the session.  Elaine demonstrated good  understanding of the education provided.     See EMR under Patient Instructions for exercises provided prior visit.    Assessment      Patient had difficulty with range of motion this session, had poor vastus medialis oblique control and fatigue with step downs.    Elaine Is progressing well towards her goals.   Pt prognosis is Excellent.     Pt will continue to benefit from skilled outpatient physical therapy to address the deficits listed in the problem list box on initial evaluation, provide pt/family education and to maximize pt's level of independence in the home and community environment.      Anticipated barriers to physical therapy: none    Goals:  Short Term Goals: 4 weeks   Pt will be independent with home ex program -met  Pt will be able to progress to straight cane with gait. -met  Be able to increase knee range of motion 0-130   Increase quad lag to 0 degrees      Long Term Goals: 8  weeks   Ambulate independently without an assistive device.   Be able to perform house work pain free   Be able to stoop and bend pain free     Plan     Plan of care Certification: 3/14/2024 to 5/17/2024.     Outpatient Physical Therapy 2 times weekly for 8 weeks to include the following interventions: Electrical Stimulation nmes, Manual Therapy, Neuromuscular Re-ed, Patient Education, Therapeutic Activities, and Therapeutic Exercise. Plan of care reviewed with Andrew Allen PT.  "     Kae Pathak, PTA  4/9/2024

## 2024-04-11 ENCOUNTER — CLINICAL SUPPORT (OUTPATIENT)
Dept: REHABILITATION | Facility: HOSPITAL | Age: 69
End: 2024-04-11
Payer: COMMERCIAL

## 2024-04-11 DIAGNOSIS — R29.898 DECREASED STRENGTH INVOLVING KNEE JOINT: Primary | ICD-10-CM

## 2024-04-11 PROCEDURE — 97112 NEUROMUSCULAR REEDUCATION: CPT | Mod: PN

## 2024-04-11 PROCEDURE — 97530 THERAPEUTIC ACTIVITIES: CPT | Mod: PN

## 2024-04-11 NOTE — PROGRESS NOTES
Physical Therapy Treatment Note     Name: Elaine Sadler  Clinic Number: [id number]    Therapy Diagnosis:   Encounter Diagnosis   Name Primary?    Decreased strength involving knee joint Yes     Physician: Chance Ross MD    Visit Date: 4/11/2024  Physician Orders: PT Eval and Treat    Medical Diagnosis from Referral: right total knee replacement 2/28/2024  Evaluation Date: 3/14/2024  Updated Plan of Care Due : 5/14/2024  Authorization Period Expiration: [employer] 5/17/2024  Plan of Care Expiration: see [employer]   Visit # / Visits authorized: 8/ 13  PTA Visit #: 4    Time In: 1002  Time Out: 1042  Total Billable Time: 40 minutes     Precautions: Standard  Dr appointment 4/15/24    Subjective     Pt reports: I'm  stiff this am.My left knee is bothering me a good bit along with my back.  She was compliant with home exercise program.  Response to previous treatment: sore  Functional change: ongoing    Pain: 1/10  Location: right knee      Objective     Range of motion right knee:  Flexion   128 degrees   Extension      -3 degrees   Quad lag    -15 degrees     Elaine participated in neuromuscular re-education activities to improve: Balance, Coordination, Proprioception, and Posture for 25 minutes. The following activities were included:  Slant board x 2' with glue set  Swissball knee flexion nerve glide x 15  Quad set with electrical stimulation x 15   Terminal knee extension electrical stimulation 20 x 3#  Right sitting hamstrings stretch x 5  Right supine straight leg raise x 10     Elaine participated in dynamic functional therapeutic activities to improve functional performance for 20  minutes, including:  Double support squats total gym x 25 to simulate bending and squatting  Double support calf raises total gym x 15 to simulate reaching on tip toes   Right single support leg presses 20 x 55# to simulate bending and squatting  Double support calf presses 20 x 60# to promote reaching on tip toes  Biking x 5' seat  "level 0 to increase endurance with walking  Left lower extremity step up/down onto 6" step x 8    Home Exercises Provided and Patient Education Provided     Education provided: continue current home exercise program, pain free, along with pain management techniques    Written Home Exercises Provided: Patient instructed to cont prior HEP.  Exercises were reviewed and Elaine was able to demonstrate them prior to the end of the session.  Elaine demonstrated good  understanding of the education provided.     See EMR under Patient Instructions for exercises provided prior visit.    Assessment      Patient had difficulty with range of motion this session, had poor vastus medialis oblique control and fatigue with step downs.    Elaine Is progressing well towards her goals.   Pt prognosis is Excellent.     Pt will continue to benefit from skilled outpatient physical therapy to address the deficits listed in the problem list box on initial evaluation, provide pt/family education and to maximize pt's level of independence in the home and community environment.      Anticipated barriers to physical therapy: none    Goals:  Short Term Goals: 4 weeks   Pt will be independent with home ex program -met  Pt will be able to progress to straight cane with gait. -met  Be able to increase knee range of motion 0-130   Increase quad lag to 0 degrees      Long Term Goals: 8  weeks   Ambulate independently without an assistive device.   Be able to perform house work pain free   Be able to stoop and bend pain free     Plan     Plan of care Certification: 3/14/2024 to 5/17/2024.     Outpatient Physical Therapy 2 times weekly for 8 weeks to include the following interventions: Electrical Stimulation nmes, Manual Therapy, Neuromuscular Re-ed, Patient Education, Therapeutic Activities, and Therapeutic Exercise. Plan of care reviewed with Andrew Allen, PT.      Andrew Allen, PT  4/11/2024       "

## 2024-04-17 ENCOUNTER — CLINICAL SUPPORT (OUTPATIENT)
Dept: REHABILITATION | Facility: HOSPITAL | Age: 69
End: 2024-04-17
Payer: COMMERCIAL

## 2024-04-17 DIAGNOSIS — M25.661 DECREASED ROM OF RIGHT KNEE: Primary | ICD-10-CM

## 2024-04-17 DIAGNOSIS — Z96.651 HISTORY OF TOTAL KNEE ARTHROPLASTY, RIGHT: ICD-10-CM

## 2024-04-17 DIAGNOSIS — R29.898 DECREASED STRENGTH INVOLVING KNEE JOINT: ICD-10-CM

## 2024-04-17 PROCEDURE — 97112 NEUROMUSCULAR REEDUCATION: CPT | Mod: PN,CQ

## 2024-04-17 PROCEDURE — 97530 THERAPEUTIC ACTIVITIES: CPT | Mod: PN,CQ

## 2024-04-17 NOTE — PROGRESS NOTES
Physical Therapy Treatment Note     Name: Elaine Sadler  Clinic Number: [id number]    Therapy Diagnosis:   Encounter Diagnoses   Name Primary?    Decreased ROM of right knee Yes    Decreased strength involving knee joint     History of total knee arthroplasty, right      Physician: Chance Ross MD    Visit Date: 4/17/2024  Physician Orders: PT Eval and Treat    Medical Diagnosis from Referral: right total knee replacement 2/28/2024  Evaluation Date: 3/14/2024  Updated Plan of Care Due : 5/14/2024  Authorization Period Expiration: [employer] 5/17/2024  Plan of Care Expiration: see [employer]   Visit # / Visits authorized: 10/ 13  PTA Visit #: 1    Time In: 913  Time Out: 1005  Total Billable Time: 50 minutes     Precautions: Standard  Dr appointment 4/15/24    Subjective     Pt reports: I got a good report from Dr, but I'm stiff this am.  She was compliant with home exercise program.  Response to previous treatment: sore  Functional change: ongoing    Pain: 1/10  Location: right knee      Objective     Range of motion right knee:  Flexion   128 degrees   Extension      -3 degrees   Quad lag    -12 degrees     Elaine participated in neuromuscular re-education activities to improve: Balance, Coordination, Proprioception, and Posture for 25 minutes. The following activities were included:  Slant board x 2' with glue set  Swissball knee flexion nerve glide x 15  Quad set with electrical stimulation x 15   Terminal knee extension electrical stimulation 20 x 3#  Right sitting hamstrings stretch x 5  Right supine straight leg raise x 10   Gray theraband isometric quad x 10    Elaine participated in dynamic functional therapeutic activities to improve functional performance for 25  minutes, including:  Double support squats total gym x 25 to simulate bending and squatting  Double support calf raises total gym x 15 to simulate reaching on tip toes   Right single support leg presses 20 x 55# to simulate bending and  squatting  Double support calf presses 20 x 60# to promote reaching on tip toes  Biking x 5' seat level 2 to increase endurance with walking  Treadmill 1.5mph x 5' to increase endurance with walking    Home Exercises Provided and Patient Education Provided     Education provided: continue current home exercise program, pain free, along with pain management techniques    Written Home Exercises Provided: Patient instructed to cont prior HEP.  Exercises were reviewed and Elaine was able to demonstrate them prior to the end of the session.  Elaine demonstrated good  understanding of the education provided.     See EMR under Patient Instructions for exercises provided prior visit.    Assessment      Patient had improved endurance with walking and quad lag with terminal knee extension  range of motion this session. Patient instructed to discontinue using cane.    Elaine Is progressing well towards her goals.   Pt prognosis is Excellent.     Pt will continue to benefit from skilled outpatient physical therapy to address the deficits listed in the problem list box on initial evaluation, provide pt/family education and to maximize pt's level of independence in the home and community environment.      Anticipated barriers to physical therapy: none    Goals:  Short Term Goals: 4 weeks   Pt will be independent with home ex program -met  Pt will be able to progress to straight cane with gait. -met  Be able to increase knee range of motion 0-130   Increase quad lag to 0 degrees      Long Term Goals: 8  weeks   Ambulate independently without an assistive device.   Be able to perform house work pain free   Be able to stoop and bend pain free     Plan     Plan of care Certification: 3/14/2024 to 5/17/2024.     Outpatient Physical Therapy 2 times weekly for 8 weeks to include the following interventions: Electrical Stimulation nmes, Manual Therapy, Neuromuscular Re-ed, Patient Education, Therapeutic Activities, and Therapeutic  Exercise. Plan of care reviewed with Andrew Allen, PT.      Kae Pathak, PTA  4/17/2024

## 2024-04-19 ENCOUNTER — CLINICAL SUPPORT (OUTPATIENT)
Dept: REHABILITATION | Facility: HOSPITAL | Age: 69
End: 2024-04-19
Payer: COMMERCIAL

## 2024-04-19 DIAGNOSIS — M25.661 DECREASED ROM OF RIGHT KNEE: Primary | ICD-10-CM

## 2024-04-19 DIAGNOSIS — Z96.651 HISTORY OF TOTAL KNEE ARTHROPLASTY, RIGHT: ICD-10-CM

## 2024-04-19 DIAGNOSIS — R29.898 DECREASED STRENGTH INVOLVING KNEE JOINT: ICD-10-CM

## 2024-04-19 PROCEDURE — 97530 THERAPEUTIC ACTIVITIES: CPT | Mod: PN,CQ

## 2024-04-19 PROCEDURE — 97112 NEUROMUSCULAR REEDUCATION: CPT | Mod: PN,CQ

## 2024-04-19 NOTE — PROGRESS NOTES
Physical Therapy Treatment Note     Name: Elaine Sadler  Clinic Number: [id number]    Therapy Diagnosis:   Encounter Diagnoses   Name Primary?    Decreased ROM of right knee Yes    History of total knee arthroplasty, right     Decreased strength involving knee joint      Physician: Chance Ross MD    Visit Date: 4/19/2024  Physician Orders: PT Eval and Treat    Medical Diagnosis from Referral: right total knee replacement 2/28/2024  Evaluation Date: 3/14/2024  Updated Plan of Care Due : 5/14/2024  Authorization Period Expiration: [employer] 5/17/2024  Plan of Care Expiration: see [employer]   Visit # / Visits authorized: 11/ 13  PTA Visit #: 2    Time In: 925  Time Out: 1010  Total Billable Time: 45 minutes     Precautions: Standard  Dr appointment 5/31/24    Subjective     Pt reports: I was up cooking and cleaning yesterday and my knee was really swollen afterwards. I'm a little stiff  this am.  She was compliant with home exercise program.  Response to previous treatment: sore  Functional change: ongoing    Pain: 1/10  Location: right knee      Objective     Range of motion right knee:  Flexion   123 degrees   Extension      -3 degrees   Quad lag    -9 degrees     Elaine participated in neuromuscular re-education activities to improve: Balance, Coordination, Proprioception, and Posture for 20 minutes. The following activities were included:  Slant board x 2' with glue set  Swissball knee flexion nerve glide x 15  Quad set with electrical stimulation x 15   Terminal knee extension electrical stimulation 20 x 4#  Right supine straight leg raise x 10       Elaine participated in dynamic functional therapeutic activities to improve functional performance for 25  minutes, including:  Double support squats total gym x 25 to simulate bending and squatting  Double support calf raises total gym x 15 to simulate reaching on tip toes   Right single support leg presses 20 x 55# to simulate bending and squatting  Double  support calf presses 20 x 60# to promote reaching on tip toes  Biking x 5' seat level 2 to increase endurance with walking  Treadmill 1.5mph x 5' to increase endurance with walking  Sit to stand without upper extremity support x 10 to be able to get up from low chair    Home Exercises Provided and Patient Education Provided     Education provided: continue current home exercise program, pain free, along with pain management techniques    Written Home Exercises Provided: Patient instructed to cont prior HEP.  Exercises were reviewed and Elaine was able to demonstrate them prior to the end of the session.  Elaine demonstrated good  understanding of the education provided.     See EMR under Patient Instructions for exercises provided prior visit.    Assessment      Patient still having poor vastus medialis oblique recruitment with electrical stimulation, patient did have improved quad lag with terminal knee extension   Elaine Is progressing well towards her goals.   Pt prognosis is Excellent.     Pt will continue to benefit from skilled outpatient physical therapy to address the deficits listed in the problem list box on initial evaluation, provide pt/family education and to maximize pt's level of independence in the home and community environment.      Anticipated barriers to physical therapy: none    Goals:  Short Term Goals: 4 weeks   Pt will be independent with home ex program -met  Pt will be able to progress to straight cane with gait. -met  Be able to increase knee range of motion 0-130   Increase quad lag to 0 degrees      Long Term Goals: 8  weeks   Ambulate independently without an assistive device.-met   Be able to perform house work pain free   Be able to stoop and bend pain free     Plan     Plan of care Certification: 3/14/2024 to 5/17/2024.     Outpatient Physical Therapy 2 times weekly for 8 weeks to include the following interventions: Electrical Stimulation nmes, Manual Therapy, Neuromuscular Re-ed,  Patient Education, Therapeutic Activities, and Therapeutic Exercise. Plan of care reviewed with Andrew Allen, PT.      Kae Pathak, PTA  4/19/2024

## 2024-04-23 ENCOUNTER — CLINICAL SUPPORT (OUTPATIENT)
Dept: REHABILITATION | Facility: HOSPITAL | Age: 69
End: 2024-04-23
Payer: COMMERCIAL

## 2024-04-23 DIAGNOSIS — R29.898 DECREASED STRENGTH INVOLVING KNEE JOINT: Primary | ICD-10-CM

## 2024-04-23 PROCEDURE — 97112 NEUROMUSCULAR REEDUCATION: CPT | Mod: PN

## 2024-04-23 PROCEDURE — 97530 THERAPEUTIC ACTIVITIES: CPT | Mod: PN

## 2024-04-23 NOTE — PROGRESS NOTES
Physical Therapy Treatment Note     Name: Elaine Sadler  Clinic Number: [id number]    Therapy Diagnosis:   Encounter Diagnosis   Name Primary?    Decreased strength involving knee joint Yes     Physician: Chance Ross MD    Visit Date: 4/23/2024  Physician Orders: PT Eval and Treat    Medical Diagnosis from Referral: right total knee replacement 2/28/2024  Evaluation Date: 3/14/2024  Updated Plan of Care Due : 5/14/2024  Authorization Period Expiration: [employer] 5/17/2024  Plan of Care Expiration: see [employer]   Visit # / Visits authorized: 12/ 13  PTA Visit #:     Time In: 925  Time Out: 1010  Total Billable Time: 45 minutes     Precautions: Standard  Dr appointment 5/31/24    Subjective     Pt reports: knees are stiff with cool weather today   She was compliant with home exercise program.  Response to previous treatment: sore  Functional change: ongoing    Pain: 3/10  Location: right knee      Objective     Range of motion right knee:  Flexion   128 degrees   Extension      -3 degrees   Quad lag    -8degrees     Elaine participated in neuromuscular re-education activities to improve: Balance, Coordination, Proprioception, and Posture for 20 minutes. The following activities were included:  Slant board x 2' with glue set  Swissball knee flexion nerve glide x 15  Quad set with electrical stimulation x 15   Terminal knee extension electrical stimulation 20 x 4#  Right supine straight leg raise x 10       Elaine participated in dynamic functional therapeutic activities to improve functional performance for 25  minutes, including:  Double support squats total gym x 25 to simulate bending and squatting  Double support calf raises total gym x 15 to simulate reaching on tip toes   Right single support leg presses 20 x 55# to simulate bending and squatting  Double support calf presses 20 x 60# to promote reaching on tip toes  Biking x 5' seat level 2 to increase endurance with walking  Treadmill 1.5mph x 5' to increase  endurance with walking  Sit to stand without upper extremity support x 10 to be able to get up from low chair    Home Exercises Provided and Patient Education Provided     Education provided: continue current home exercise program, pain free, along with pain management techniques    Written Home Exercises Provided: Patient instructed to cont prior HEP.  Exercises were reviewed and Elaine was able to demonstrate them prior to the end of the session.  Elaine demonstrated good  understanding of the education provided.     See EMR under Patient Instructions for exercises provided prior visit.    Assessment     Pt stll having difficulty with quad strength      Elaine Is progressing well towards her goals.   Pt prognosis is Excellent.     Pt will continue to benefit from skilled outpatient physical therapy to address the deficits listed in the problem list box on initial evaluation, provide pt/family education and to maximize pt's level of independence in the home and community environment.      Anticipated barriers to physical therapy: none    Goals:  Short Term Goals: 4 weeks   Pt will be independent with home ex program -met  Pt will be able to progress to straight cane with gait. -met  Be able to increase knee range of motion 0-130   Increase quad lag to 0 degrees      Long Term Goals: 8  weeks   Ambulate independently without an assistive device.-met   Be able to perform house work pain free   Be able to stoop and bend pain free     Plan     Plan of care Certification: 3/14/2024 to 5/17/2024.     Outpatient Physical Therapy 2 times weekly for 8 weeks to include the following interventions: Electrical Stimulation nmes, Manual Therapy, Neuromuscular Re-ed, Patient Education, Therapeutic Activities, and Therapeutic Exercise.       Andrew Allen, PT  4/23/2024

## 2024-04-25 ENCOUNTER — CLINICAL SUPPORT (OUTPATIENT)
Dept: REHABILITATION | Facility: HOSPITAL | Age: 69
End: 2024-04-25
Payer: COMMERCIAL

## 2024-04-25 DIAGNOSIS — M25.661 DECREASED ROM OF RIGHT KNEE: ICD-10-CM

## 2024-04-25 DIAGNOSIS — R29.898 DECREASED STRENGTH INVOLVING KNEE JOINT: Primary | ICD-10-CM

## 2024-04-25 PROCEDURE — 97530 THERAPEUTIC ACTIVITIES: CPT | Mod: PN,CQ

## 2024-04-25 PROCEDURE — 97112 NEUROMUSCULAR REEDUCATION: CPT | Mod: PN,CQ

## 2024-04-25 NOTE — PLAN OF CARE
"  Physical Therapy Treatment Note      Name: Elaine Sadler  Clinic Number: [id number]     Therapy Diagnosis:        Encounter Diagnoses   Name Primary?    Decreased strength involving knee joint Yes    Decreased ROM of right knee        Physician: Chance Ross MD     Visit Date: 4/25/2024  Physician Orders: PT Eval and Treat    Medical Diagnosis from Referral: right total knee replacement 2/28/2024  Evaluation Date: 3/14/2024  Updated Plan of Care Due : 5/14/2024  Authorization Period Expiration: [employer] 5/17/2024  Plan of Care Expiration: see [employer]   Visit # / Visits authorized: 13/ 13  PTA Visit #: 1     Time In: 920  Time Out: 1005  Total Billable Time: 45 minutes      Precautions: Standard  Dr appointment 5/31/24     Subjective      Pt reports: I'm just stiff this am  She was compliant with home exercise program.  Response to previous treatment: sore  Functional change: ongoing     Pain: 1/10  Location: right knee       Objective      Range of motion right knee:  Flexion              128 degrees   Extension            -3 degrees   Quad lag            -7 degrees      Elaine participated in neuromuscular re-education activities to improve: Balance, Coordination, Proprioception, and Posture for 22 minutes. The following activities were included:  Slant board x 2' with glue set  Swissball knee flexion nerve glide x 15  Quad set with electrical stimulation x 15   Terminal knee extension electrical stimulation 20 x 4#  Right supine straight leg raise x 10   Right reverse sept downs 6" x 10     Elaine participated in dynamic functional therapeutic activities to improve functional performance for 23 minutes, including:  Double support squats total gym x 25 to simulate bending and squatting  Double support calf raises total gym x 15 to simulate reaching on tip toes   Right single support leg presses 20 x 55# to simulate bending and squatting  Double support calf presses 20 x 60# to promote reaching on tip " toes  Biking x 6' seat level 2 to increase endurance with walking  Stairs up/down x 5 repetitions to promote community stairclimbing        Home Exercises Provided and Patient Education Provided      Education provided: continue current home exercise program, pain free, along with pain management techniques     Written Home Exercises Provided: Patient instructed to cont prior HEP.  Exercises were reviewed and Elaine was able to demonstrate them prior to the end of the session.  Elaine demonstrated good  understanding of the education provided.      See EMR under Patient Instructions for exercises provided prior visit.     Assessment     Pt stll having c/o posterior knee pain when leg fully extended while performing quad sets. Patient instructed to start walking program to increase endurance and overall conditioning.    Elaine Is progressing well towards her goals.   Pt prognosis is Excellent.      Pt will continue to benefit from skilled outpatient physical therapy to address the deficits listed in the problem list box on initial evaluation, provide pt/family education and to maximize pt's level of independence in the home and community environment.      Anticipated barriers to physical therapy: none     Goals:  Short Term Goals: 4 weeks   Pt will be independent with home ex program -met  Pt will be able to progress to straight cane with gait. -met  Be able to increase knee range of motion 0-130   Increase quad lag to 0 degrees      Long Term Goals: 8  weeks   Ambulate independently without an assistive device.-met   Be able to perform house work pain free   Be able to stoop and bend pain free      Plan                     Reasons for Recertification of Therapy: cont PT     Plan     Updated Certification Period: 4/25/2024 to 6/25/2024  Recommended Treatment Plan: 2 times per week for 8 weeks: Electrical Stimulation nmes, Manual Therapy, Neuromuscular Re-ed, Patient Education, Therapeutic Activities, and Therapeutic  Exercise  Other Recommendations: cont PT     Andrew Allen, PT  4/25/2024      I CERTIFY THE NEED FOR THESE SERVICES FURNISHED UNDER THIS PLAN OF TREATMENT AND WHILE UNDER MY CARE.    Physician's comments:      Physician's Signature: ___________________________________________________

## 2024-04-25 NOTE — PROGRESS NOTES
"  Physical Therapy Treatment Note     Name: Elaine Sadler  Clinic Number: [id number]    Therapy Diagnosis:   Encounter Diagnoses   Name Primary?    Decreased strength involving knee joint Yes    Decreased ROM of right knee      Physician: Chance Ross MD    Visit Date: 4/25/2024  Physician Orders: PT Eval and Treat    Medical Diagnosis from Referral: right total knee replacement 2/28/2024  Evaluation Date: 3/14/2024  Updated Plan of Care Due : 5/14/2024  Authorization Period Expiration: [employer] 5/17/2024  Plan of Care Expiration: see [employer]   Visit # / Visits authorized: 13/ 13  PTA Visit #: 1    Time In: 920  Time Out: 1005  Total Billable Time: 45 minutes     Precautions: Standard  Dr appointment 5/31/24    Subjective     Pt reports: I'm just stiff this am  She was compliant with home exercise program.  Response to previous treatment: sore  Functional change: ongoing    Pain: 1/10  Location: right knee      Objective     Range of motion right knee:  Flexion   128 degrees   Extension      -3 degrees   Quad lag    -7 degrees     Elaine participated in neuromuscular re-education activities to improve: Balance, Coordination, Proprioception, and Posture for 22 minutes. The following activities were included:  Slant board x 2' with glue set  Swissball knee flexion nerve glide x 15  Quad set with electrical stimulation x 15   Terminal knee extension electrical stimulation 20 x 4#  Right supine straight leg raise x 10   Right reverse sept downs 6" x 10    Elaine participated in dynamic functional therapeutic activities to improve functional performance for 23 minutes, including:  Double support squats total gym x 25 to simulate bending and squatting  Double support calf raises total gym x 15 to simulate reaching on tip toes   Right single support leg presses 20 x 55# to simulate bending and squatting  Double support calf presses 20 x 60# to promote reaching on tip toes  Biking x 6' seat level 2 to increase endurance " with walking  Stairs up/down x 5 repetitions to promote community stairclimbing      Home Exercises Provided and Patient Education Provided     Education provided: continue current home exercise program, pain free, along with pain management techniques    Written Home Exercises Provided: Patient instructed to cont prior HEP.  Exercises were reviewed and Elaine was able to demonstrate them prior to the end of the session.  Elaine demonstrated good  understanding of the education provided.     See EMR under Patient Instructions for exercises provided prior visit.    Assessment     Pt stll having c/o posterior knee pain when leg fully extended while performing quad sets. Patient instructed to start walking program to increase endurance and overall conditioning.    Elaine Is progressing well towards her goals.   Pt prognosis is Excellent.     Pt will continue to benefit from skilled outpatient physical therapy to address the deficits listed in the problem list box on initial evaluation, provide pt/family education and to maximize pt's level of independence in the home and community environment.      Anticipated barriers to physical therapy: none    Goals:  Short Term Goals: 4 weeks   Pt will be independent with home ex program -met  Pt will be able to progress to straight cane with gait. -met  Be able to increase knee range of motion 0-130   Increase quad lag to 0 degrees      Long Term Goals: 8  weeks   Ambulate independently without an assistive device.-met   Be able to perform house work pain free   Be able to stoop and bend pain free     Plan     Plan of care Certification: 3/14/2024 to 5/17/2024.     Outpatient Physical Therapy 2 times weekly for 8 weeks to include the following interventions: Electrical Stimulation nmes, Manual Therapy, Neuromuscular Re-ed, Patient Education, Therapeutic Activities, and Therapeutic Exercise.       Kae Pathak, PTA  4/25/2024

## 2024-04-30 ENCOUNTER — CLINICAL SUPPORT (OUTPATIENT)
Dept: REHABILITATION | Facility: HOSPITAL | Age: 69
End: 2024-04-30
Payer: COMMERCIAL

## 2024-04-30 DIAGNOSIS — R29.898 DECREASED STRENGTH INVOLVING KNEE JOINT: Primary | ICD-10-CM

## 2024-04-30 PROCEDURE — 97530 THERAPEUTIC ACTIVITIES: CPT | Mod: PN,CQ

## 2024-04-30 PROCEDURE — 97112 NEUROMUSCULAR REEDUCATION: CPT | Mod: PN,CQ

## 2024-04-30 NOTE — PROGRESS NOTES
"  Physical Therapy Treatment Note     Name: Elaine Sadler  Clinic Number: [id number]    Therapy Diagnosis:   Encounter Diagnosis   Name Primary?    Decreased strength involving knee joint Yes     Physician: Chance Ross MD    Visit Date: 4/30/2024  Physician Orders: PT Eval and Treat    Medical Diagnosis from Referral: right total knee replacement 2/28/2024  Evaluation Date: 3/14/2024  Updated Plan of Care Due : 5/14/2024  Authorization Period Expiration: [employer] 5/17/2024  Plan of Care Expiration: see [employer]   Visit # / Visits authorized: 14/ 16  PTA Visit #: 2    Time In: 840  Time Out: 925  Total Billable Time: 45 minutes     Precautions: Standard  Dr appointment 5/31/24    Subjective     Pt reports: I'm just sore along the incision.  She was compliant with home exercise program.  Response to previous treatment: sore  Functional change: ongoing    Pain: 1/10  Location: right knee      Objective     Range of motion right knee:  Flexion   125 degrees   Extension      -3 degrees   Quad lag    -7 degrees     Elaine participated in neuromuscular re-education activities to improve: Balance, Coordination, Proprioception, and Posture for 22 minutes. The following activities were included:  Slant board x 2' with glue set  Swissball knee flexion nerve glide x 15  Quad set with electrical stimulation x 15   Terminal knee extension electrical stimulation 20 x 4#  Right sitting hamstrings stretch x   Right reverse sept downs 6" x 10  Gray theraband isometric quad x 10    Elaine participated in dynamic functional therapeutic activities to improve functional performance for 23 minutes, including:  Double support squats total gym x 25 to simulate bending and squatting  Double support calf raises total gym x 15 to simulate reaching on tip toes   Right single support leg presses 20 x 55# to simulate bending and squatting  Double support calf presses 20 x 60# to promote reaching on tip toes  Biking x 6' seat level 2 to " increase endurance with walking  Stairs up/down x 5 repetitions  with reciprocal motion to promote community stairclimbing    Home Exercises Provided and Patient Education Provided     Education provided: continue current home exercise program, pain free, along with pain management techniques    Written Home Exercises Provided: Patient instructed to cont prior HEP.  Exercises were reviewed and Elaine was able to demonstrate them prior to the end of the session.  Elaine demonstrated good  understanding of the education provided.     See EMR under Patient Instructions for exercises provided prior visit.    Assessment     Pt instructed to perform scar massage along incision and be less apprehensive with movements.   Elaine Is progressing well towards her goals.   Pt prognosis is Excellent.     Pt will continue to benefit from skilled outpatient physical therapy to address the deficits listed in the problem list box on initial evaluation, provide pt/family education and to maximize pt's level of independence in the home and community environment.      Anticipated barriers to physical therapy: none    Goals:  Short Term Goals: 4 weeks   Pt will be independent with home ex program -met  Pt will be able to progress to straight cane with gait. -met  Be able to increase knee range of motion 0-130   Increase quad lag to 0 degrees      Long Term Goals: 8  weeks   Ambulate independently without an assistive device.-met   Be able to perform house work pain free   Be able to stoop and bend pain free     Plan     Plan of care Certification: 3/14/2024 to 5/17/2024.     Outpatient Physical Therapy 2 times weekly for 8 weeks to include the following interventions: Electrical Stimulation nmes, Manual Therapy, Neuromuscular Re-ed, Patient Education, Therapeutic Activities, and Therapeutic Exercise.       Kae Pathak, PTA  4/30/2024

## 2024-05-06 ENCOUNTER — CLINICAL SUPPORT (OUTPATIENT)
Dept: REHABILITATION | Facility: HOSPITAL | Age: 69
End: 2024-05-06
Payer: COMMERCIAL

## 2024-05-06 DIAGNOSIS — M25.661 DECREASED ROM OF RIGHT KNEE: ICD-10-CM

## 2024-05-06 DIAGNOSIS — R29.898 DECREASED STRENGTH INVOLVING KNEE JOINT: Primary | ICD-10-CM

## 2024-05-06 DIAGNOSIS — Z74.09 DECREASED FUNCTIONAL MOBILITY AND ENDURANCE: ICD-10-CM

## 2024-05-06 DIAGNOSIS — Z96.651 HISTORY OF TOTAL KNEE ARTHROPLASTY, RIGHT: ICD-10-CM

## 2024-05-06 PROCEDURE — 97530 THERAPEUTIC ACTIVITIES: CPT | Mod: PN

## 2024-05-06 PROCEDURE — 97112 NEUROMUSCULAR REEDUCATION: CPT | Mod: PN

## 2024-05-06 NOTE — PROGRESS NOTES
"  Physical Therapy Treatment Note     Name: Elaine Sadler  Clinic Number: [id number]    Therapy Diagnosis:   Encounter Diagnoses   Name Primary?    Decreased strength involving knee joint Yes    Decreased ROM of right knee     Decreased functional mobility and endurance     History of total knee arthroplasty, right      Physician: Chance Ross MD    Visit Date: 5/6/2024  Physician Orders: PT Eval and Treat    Medical Diagnosis from Referral: right total knee replacement 2/28/2024  Evaluation Date: 3/14/2024  Updated Plan of Care Due : 5/14/2024  Authorization Period Expiration: [employer] 5/17/2024  Plan of Care Expiration: see [employer]   Visit # / Visits authorized: 15/ 16  PTA Visit #:     Time In: 830  Time Out: 925  Total Billable Time: 45 minutes     Precautions: Standard  Dr appointment 5/31/24    Subjective     Pt reports: I was out in sun and the incision is sore , ( sunburn )   She was compliant with home exercise program.  Response to previous treatment: sore  Functional change: ongoing    Pain: 0/10  Location: right knee      Objective     Range of motion right knee:  Flexion   130 degrees   Extension      -0 degrees   Quad lag    -0 degrees     Elaine participated in neuromuscular re-education activities to improve: Balance, Coordination, Proprioception, and Posture for 22 minutes. The following activities were included:  Slant board x 2' with glue set  Swissball knee flexion nerve glide x 15  Quad set with electrical stimulation x 15   Terminal knee extension electrical stimulation 20 x 4#  Right sitting hamstrings stretch x   Right reverse sept downs 6" x 10  Gray theraband isometric quad x 10    Elaine participated in dynamic functional therapeutic activities to improve functional performance for 23 minutes, including:  Double support squats total gym x 25 to simulate bending and squatting  Double support calf raises total gym x 15 to simulate reaching on tip toes   Right single support leg presses 20 " x 55# to simulate bending and squatting  Double support calf presses 20 x 60# to promote reaching on tip toes  Biking x 6' seat level 2 to increase endurance with walking  Stairs up/down x 5 repetitions  with reciprocal motion to promote community stairclimbing    Home Exercises Provided and Patient Education Provided     Education provided: continue current home exercise program, pain free, along with pain management techniques    Written Home Exercises Provided: Patient instructed to cont prior HEP.  Exercises were reviewed and Elaine was able to demonstrate them prior to the end of the session.  Elaine demonstrated good  understanding of the education provided.     See EMR under Patient Instructions for exercises provided prior visit.    Assessment     Pt improving with range of motion   Elaine Is progressing well towards her goals.   Pt prognosis is Excellent.     Pt will continue to benefit from skilled outpatient physical therapy to address the deficits listed in the problem list box on initial evaluation, provide pt/family education and to maximize pt's level of independence in the home and community environment.      Anticipated barriers to physical therapy: none    Goals:  Short Term Goals: 4 weeks   Pt will be independent with home ex program -met  Pt will be able to progress to straight cane with gait. -met  Be able to increase knee range of motion 0-130   Increase quad lag to 0 degrees      Long Term Goals: 8  weeks   Ambulate independently without an assistive device.-met   Be able to perform house work pain free   Be able to stoop and bend pain free     Plan     Plan of care Certification: 3/14/2024 to 5/17/2024.     Outpatient Physical Therapy 2 times weekly for 8 weeks to include the following interventions: Electrical Stimulation nmes, Manual Therapy, Neuromuscular Re-ed, Patient Education, Therapeutic Activities, and Therapeutic Exercise.     Plan of care has been reestablished with Trice Ptahak  SAUL[place], Peggy Villegas LPTA, Brittany Krause LPTA  and Sujatha Santana LPTA.         Andrew Allen, PT  5/6/2024

## 2024-05-09 ENCOUNTER — CLINICAL SUPPORT (OUTPATIENT)
Dept: REHABILITATION | Facility: HOSPITAL | Age: 69
End: 2024-05-09
Payer: COMMERCIAL

## 2024-05-09 DIAGNOSIS — R29.898 DECREASED STRENGTH INVOLVING KNEE JOINT: Primary | ICD-10-CM

## 2024-05-09 DIAGNOSIS — M25.661 DECREASED ROM OF RIGHT KNEE: ICD-10-CM

## 2024-05-09 PROCEDURE — 97112 NEUROMUSCULAR REEDUCATION: CPT | Mod: PN,CQ

## 2024-05-09 PROCEDURE — 97530 THERAPEUTIC ACTIVITIES: CPT | Mod: PN,CQ

## 2024-05-09 NOTE — PROGRESS NOTES
"  Physical Therapy Treatment Note     Name: Elaine Sadler  Clinic Number: [id number]    Therapy Diagnosis:   Encounter Diagnoses   Name Primary?    Decreased strength involving knee joint Yes    Decreased ROM of right knee      Physician: Chance Ross MD    Visit Date: 5/9/2024  Physician Orders: PT Eval and Treat    Medical Diagnosis from Referral: right total knee replacement 2/28/2024  Evaluation Date: 3/14/2024  Updated Plan of Care Due : 5/14/2024  Authorization Period Expiration: [employer] 5/17/2024  Plan of Care Expiration: see [employer]   Visit # / Visits authorized: 16/ 16  PTA Visit #: 1    Time In: 1000  Time Out: 1045  Total Billable Time: 45 minutes     Precautions: Standard  Dr appointment 5/31/24    Subjective     Pt reports: I'm doing good  She was compliant with home exercise program.  Response to previous treatment: sore  Functional change: ongoing    Pain: 0/10  Location: right knee      Objective     Range of motion right knee:  Flexion   130 degrees   Extension      -0 degrees   Quad lag    -0 degrees     Elaine participated in neuromuscular re-education activities to improve: Balance, Coordination, Proprioception, and Posture for 22 minutes. The following activities were included:  Slant board x 2' with glue set  Swissball knee flexion nerve glide x 15  Quad set with electrical stimulation x 15   Terminal knee extension electrical stimulation 20 x 4#  Right sitting hamstrings stretch x   Right reverse sept downs 6" x 10  Gray theraband isometric quad x 10    Elaine participated in dynamic functional therapeutic activities to improve functional performance for 23 minutes, including:  Double support squats total gym x 25 to simulate bending and squatting  Double support calf raises total gym x 15 to simulate reaching on tip toes   Right single support leg presses 20 x 55# to simulate bending and squatting  Double support calf presses 20 x 60# to promote reaching on tip toes  Treadmill 1.5 mph x " 4' to promote endurance with community ambulation  Stairs up/down x 5 repetitions  with reciprocal motion to promote community stairclimbing    Home Exercises Provided and Patient Education Provided     Education provided: continue current home exercise program, pain free, along with pain management techniques    Written Home Exercises Provided: Patient instructed to cont prior HEP.  Exercises were reviewed and Elaine was able to demonstrate them prior to the end of the session.  Elaine demonstrated good  understanding of the education provided.     See EMR under Patient Instructions for exercises provided prior visit.    Assessment     Pt instructed to be consistent with home exercise program,  met all goals outlined in evaluation.  Elaine Is progressing well towards her goals.   Pt prognosis is Excellent.     Pt will continue to benefit from skilled outpatient physical therapy to address the deficits listed in the problem list box on initial evaluation, provide pt/family education and to maximize pt's level of independence in the home and community environment.      Anticipated barriers to physical therapy: none    Goals:  Short Term Goals: 4 weeks   Pt will be independent with home ex program -met  Pt will be able to progress to straight cane with gait. -met  Be able to increase knee range of motion 0-130   Increase quad lag to 0 degrees      Long Term Goals: 8  weeks   Ambulate independently without an assistive device.-met   Be able to perform house work pain free -met  Be able to stoop and bend pain free met    Plan   Will d/c to home exercise program, see physical therapist d/c note.    Kae Pathak, PTA  5/9/2024

## 2024-05-09 NOTE — PLAN OF CARE
"  Physical Therapy Treatment Note      Name: Elaine Sadler  Clinic Number: [id number]     Therapy Diagnosis:        Encounter Diagnoses   Name Primary?    Decreased strength involving knee joint Yes    Decreased ROM of right knee        Physician: Chance Ross MD     Visit Date: 5/9/2024  Physician Orders: PT Eval and Treat    Medical Diagnosis from Referral: right total knee replacement 2/28/2024  Evaluation Date: 3/14/2024  Updated Plan of Care Due : 5/14/2024  Authorization Period Expiration: [employer] 5/17/2024  Plan of Care Expiration: see [employer]   Visit # / Visits authorized: 16/ 16  PTA Visit #: 1     Time In: 1000  Time Out: 1045  Total Billable Time: 45 minutes      Precautions: Standard  Dr appointment 5/31/24     Subjective      Pt reports: I'm doing good  She was compliant with home exercise program.  Response to previous treatment: sore  Functional change: ongoing     Pain: 0/10  Location: right knee       Objective      Range of motion right knee:  Flexion              130 degrees   Extension            -0 degrees   Quad lag            -0 degrees      Elaine participated in neuromuscular re-education activities to improve: Balance, Coordination, Proprioception, and Posture for 22 minutes. The following activities were included:  Slant board x 2' with glue set  Swissball knee flexion nerve glide x 15  Quad set with electrical stimulation x 15   Terminal knee extension electrical stimulation 20 x 4#  Right sitting hamstrings stretch x   Right reverse sept downs 6" x 10  Gray theraband isometric quad x 10     Elaine participated in dynamic functional therapeutic activities to improve functional performance for 23 minutes, including:  Double support squats total gym x 25 to simulate bending and squatting  Double support calf raises total gym x 15 to simulate reaching on tip toes   Right single support leg presses 20 x 55# to simulate bending and squatting  Double support calf presses 20 x 60# to promote " reaching on tip toes  Treadmill 1.5 mph x 4' to promote endurance with community ambulation  Stairs up/down x 5 repetitions  with reciprocal motion to promote community stairclimbing     Home Exercises Provided and Patient Education Provided      Education provided: continue current home exercise program, pain free, along with pain management techniques     Written Home Exercises Provided: Patient instructed to cont prior HEP.  Exercises were reviewed and Elaine was able to demonstrate them prior to the end of the session.  Elaine demonstrated good  understanding of the education provided.      See EMR under Patient Instructions for exercises provided prior visit.     Assessment     Pt instructed to be consistent with home exercise program,  met all goals outlined in evaluation.  Elaine Is progressing well towards her goals.   Pt prognosis is Excellent.      Pt will continue to benefit from skilled outpatient physical therapy to address the deficits listed in the problem list box on initial evaluation, provide pt/family education and to maximize pt's level of independence in the home and community environment.      Anticipated barriers to physical therapy: none     Goals:  Short Term Goals: 4 weeks   Pt will be independent with home ex program -met  Pt will be able to progress to straight cane with gait. -met  Be able to increase knee range of motion 0-130   Increase quad lag to 0 degrees      Long Term Goals: 8  weeks   Ambulate independently without an assistive device.-met   Be able to perform house work pain free -met  Be able to stoop and bend pain free met     Outpatient Therapy Discharge Summary     Name: Elaine Sadler  Clinic Number: [id number]    Therapy Diagnosis:   Encounter Diagnoses   Name Primary?    Decreased strength involving knee joint Yes    Decreased ROM of right knee      Physician: Chance Ross MD              Assessment    Goals:  Pt is doing excellent and met all goals .     Discharge  reason: Patient has met all of his/her goals    Plan   This patient is discharged from Physical Therapy.   Andrew Allen, PT